# Patient Record
Sex: MALE | Race: WHITE | Employment: FULL TIME | URBAN - METROPOLITAN AREA
[De-identification: names, ages, dates, MRNs, and addresses within clinical notes are randomized per-mention and may not be internally consistent; named-entity substitution may affect disease eponyms.]

---

## 2019-12-18 ENCOUNTER — APPOINTMENT (OUTPATIENT)
Dept: CT IMAGING | Facility: CLINIC | Age: 54
DRG: 175 | End: 2019-12-18
Attending: EMERGENCY MEDICINE
Payer: COMMERCIAL

## 2019-12-18 ENCOUNTER — HOSPITAL ENCOUNTER (INPATIENT)
Facility: CLINIC | Age: 54
LOS: 4 days | Discharge: HOME | DRG: 175 | End: 2019-12-22
Attending: EMERGENCY MEDICINE | Admitting: INTERNAL MEDICINE
Payer: COMMERCIAL

## 2019-12-18 DIAGNOSIS — R91.8 LUNG MASS: ICD-10-CM

## 2019-12-18 DIAGNOSIS — I26.99 ACUTE PULMONARY EMBOLISM, UNSPECIFIED PULMONARY EMBOLISM TYPE, UNSPECIFIED WHETHER ACUTE COR PULMONALE PRESENT (H): ICD-10-CM

## 2019-12-18 LAB
ALBUMIN SERPL-MCNC: 3.9 G/DL (ref 3.4–5)
ALP SERPL-CCNC: 81 U/L (ref 40–150)
ALT SERPL W P-5'-P-CCNC: 22 U/L (ref 0–70)
ANION GAP SERPL CALCULATED.3IONS-SCNC: 3 MMOL/L (ref 3–14)
AST SERPL W P-5'-P-CCNC: 18 U/L (ref 0–45)
BASOPHILS # BLD AUTO: 0 10E9/L (ref 0–0.2)
BASOPHILS NFR BLD AUTO: 0.3 %
BILIRUB SERPL-MCNC: 0.5 MG/DL (ref 0.2–1.3)
BUN SERPL-MCNC: 18 MG/DL (ref 7–30)
CALCIUM SERPL-MCNC: 9.2 MG/DL (ref 8.5–10.1)
CHLORIDE SERPL-SCNC: 106 MMOL/L (ref 94–109)
CO2 SERPL-SCNC: 29 MMOL/L (ref 20–32)
CREAT SERPL-MCNC: 1.09 MG/DL (ref 0.66–1.25)
D DIMER PPP FEU-MCNC: 5.8 UG/ML FEU (ref 0–0.5)
DIFFERENTIAL METHOD BLD: NORMAL
EOSINOPHIL # BLD AUTO: 0.3 10E9/L (ref 0–0.7)
EOSINOPHIL NFR BLD AUTO: 4.1 %
ERYTHROCYTE [DISTWIDTH] IN BLOOD BY AUTOMATED COUNT: 11.9 % (ref 10–15)
ERYTHROCYTE [DISTWIDTH] IN BLOOD BY AUTOMATED COUNT: 12.1 % (ref 10–15)
GFR SERPL CREATININE-BSD FRML MDRD: 76 ML/MIN/{1.73_M2}
GLUCOSE SERPL-MCNC: 88 MG/DL (ref 70–99)
HCT VFR BLD AUTO: 39.9 % (ref 40–53)
HCT VFR BLD AUTO: 43.3 % (ref 40–53)
HGB BLD-MCNC: 13.9 G/DL (ref 13.3–17.7)
HGB BLD-MCNC: 15.2 G/DL (ref 13.3–17.7)
IMM GRANULOCYTES # BLD: 0 10E9/L (ref 0–0.4)
IMM GRANULOCYTES NFR BLD: 0.1 %
INTERPRETATION ECG - MUSE: NORMAL
LIPASE SERPL-CCNC: 88 U/L (ref 73–393)
LMWH PPP CHRO-ACNC: 0.33 IU/ML
LYMPHOCYTES # BLD AUTO: 1 10E9/L (ref 0.8–5.3)
LYMPHOCYTES NFR BLD AUTO: 13.6 %
MCH RBC QN AUTO: 32.2 PG (ref 26.5–33)
MCH RBC QN AUTO: 32.3 PG (ref 26.5–33)
MCHC RBC AUTO-ENTMCNC: 34.8 G/DL (ref 31.5–36.5)
MCHC RBC AUTO-ENTMCNC: 35.1 G/DL (ref 31.5–36.5)
MCV RBC AUTO: 92 FL (ref 78–100)
MCV RBC AUTO: 92 FL (ref 78–100)
MONOCYTES # BLD AUTO: 0.7 10E9/L (ref 0–1.3)
MONOCYTES NFR BLD AUTO: 9.4 %
NEUTROPHILS # BLD AUTO: 5.2 10E9/L (ref 1.6–8.3)
NEUTROPHILS NFR BLD AUTO: 72.5 %
NRBC # BLD AUTO: 0 10*3/UL
NRBC BLD AUTO-RTO: 0 /100
NT-PROBNP SERPL-MCNC: 45 PG/ML (ref 0–900)
PLATELET # BLD AUTO: 152 10E9/L (ref 150–450)
PLATELET # BLD AUTO: 170 10E9/L (ref 150–450)
POTASSIUM SERPL-SCNC: 4.2 MMOL/L (ref 3.4–5.3)
PROT SERPL-MCNC: 8.3 G/DL (ref 6.8–8.8)
RADIOLOGIST FLAGS: ABNORMAL
RBC # BLD AUTO: 4.32 10E12/L (ref 4.4–5.9)
RBC # BLD AUTO: 4.71 10E12/L (ref 4.4–5.9)
SODIUM SERPL-SCNC: 138 MMOL/L (ref 133–144)
TROPONIN I SERPL-MCNC: <0.015 UG/L (ref 0–0.04)
WBC # BLD AUTO: 6.8 10E9/L (ref 4–11)
WBC # BLD AUTO: 7.1 10E9/L (ref 4–11)

## 2019-12-18 PROCEDURE — 99285 EMERGENCY DEPT VISIT HI MDM: CPT | Mod: 25

## 2019-12-18 PROCEDURE — 85379 FIBRIN DEGRADATION QUANT: CPT | Performed by: EMERGENCY MEDICINE

## 2019-12-18 PROCEDURE — 25000128 H RX IP 250 OP 636: Performed by: EMERGENCY MEDICINE

## 2019-12-18 PROCEDURE — 25000132 ZZH RX MED GY IP 250 OP 250 PS 637: Performed by: INTERNAL MEDICINE

## 2019-12-18 PROCEDURE — 83880 ASSAY OF NATRIURETIC PEPTIDE: CPT | Performed by: INTERNAL MEDICINE

## 2019-12-18 PROCEDURE — 85520 HEPARIN ASSAY: CPT | Performed by: INTERNAL MEDICINE

## 2019-12-18 PROCEDURE — 96366 THER/PROPH/DIAG IV INF ADDON: CPT

## 2019-12-18 PROCEDURE — 25000125 ZZHC RX 250: Performed by: EMERGENCY MEDICINE

## 2019-12-18 PROCEDURE — 25000128 H RX IP 250 OP 636: Performed by: INTERNAL MEDICINE

## 2019-12-18 PROCEDURE — 85025 COMPLETE CBC W/AUTO DIFF WBC: CPT | Performed by: EMERGENCY MEDICINE

## 2019-12-18 PROCEDURE — 99223 1ST HOSP IP/OBS HIGH 75: CPT | Mod: AI | Performed by: INTERNAL MEDICINE

## 2019-12-18 PROCEDURE — 80053 COMPREHEN METABOLIC PANEL: CPT | Performed by: EMERGENCY MEDICINE

## 2019-12-18 PROCEDURE — 36415 COLL VENOUS BLD VENIPUNCTURE: CPT | Performed by: INTERNAL MEDICINE

## 2019-12-18 PROCEDURE — 83690 ASSAY OF LIPASE: CPT | Performed by: EMERGENCY MEDICINE

## 2019-12-18 PROCEDURE — 12000000 ZZH R&B MED SURG/OB

## 2019-12-18 PROCEDURE — 96365 THER/PROPH/DIAG IV INF INIT: CPT | Mod: 59

## 2019-12-18 PROCEDURE — 84484 ASSAY OF TROPONIN QUANT: CPT | Performed by: EMERGENCY MEDICINE

## 2019-12-18 PROCEDURE — 71275 CT ANGIOGRAPHY CHEST: CPT

## 2019-12-18 PROCEDURE — 93005 ELECTROCARDIOGRAM TRACING: CPT

## 2019-12-18 PROCEDURE — 85027 COMPLETE CBC AUTOMATED: CPT | Performed by: INTERNAL MEDICINE

## 2019-12-18 RX ORDER — NALOXONE HYDROCHLORIDE 0.4 MG/ML
.1-.4 INJECTION, SOLUTION INTRAMUSCULAR; INTRAVENOUS; SUBCUTANEOUS
Status: DISCONTINUED | OUTPATIENT
Start: 2019-12-18 | End: 2019-12-22 | Stop reason: HOSPADM

## 2019-12-18 RX ORDER — ACETAMINOPHEN 325 MG/1
650 TABLET ORAL EVERY 4 HOURS PRN
Status: DISCONTINUED | OUTPATIENT
Start: 2019-12-18 | End: 2019-12-22 | Stop reason: HOSPADM

## 2019-12-18 RX ORDER — HEPARIN SODIUM 10000 [USP'U]/100ML
18 INJECTION, SOLUTION INTRAVENOUS ONCE
Status: DISCONTINUED | OUTPATIENT
Start: 2019-12-18 | End: 2019-12-18

## 2019-12-18 RX ORDER — FAMOTIDINE 20 MG/1
20 TABLET, FILM COATED ORAL 2 TIMES DAILY
Status: DISCONTINUED | OUTPATIENT
Start: 2019-12-18 | End: 2019-12-22 | Stop reason: HOSPADM

## 2019-12-18 RX ORDER — ONDANSETRON 4 MG/1
4 TABLET, ORALLY DISINTEGRATING ORAL EVERY 6 HOURS PRN
Status: DISCONTINUED | OUTPATIENT
Start: 2019-12-18 | End: 2019-12-22 | Stop reason: HOSPADM

## 2019-12-18 RX ORDER — MULTIPLE VITAMINS W/ MINERALS TAB 9MG-400MCG
1 TAB ORAL DAILY
Status: ON HOLD | COMMUNITY
End: 2019-12-22

## 2019-12-18 RX ORDER — HEPARIN SODIUM 10000 [USP'U]/100ML
18 INJECTION, SOLUTION INTRAVENOUS ONCE
Status: COMPLETED | OUTPATIENT
Start: 2019-12-18 | End: 2019-12-18

## 2019-12-18 RX ORDER — HEPARIN SODIUM 10000 [USP'U]/100ML
1550 INJECTION, SOLUTION INTRAVENOUS ONCE
Status: COMPLETED | OUTPATIENT
Start: 2019-12-18 | End: 2019-12-18

## 2019-12-18 RX ORDER — IOPAMIDOL 755 MG/ML
73 INJECTION, SOLUTION INTRAVASCULAR ONCE
Status: COMPLETED | OUTPATIENT
Start: 2019-12-18 | End: 2019-12-18

## 2019-12-18 RX ORDER — LIDOCAINE 40 MG/G
CREAM TOPICAL
Status: DISCONTINUED | OUTPATIENT
Start: 2019-12-18 | End: 2019-12-22 | Stop reason: HOSPADM

## 2019-12-18 RX ORDER — ONDANSETRON 2 MG/ML
4 INJECTION INTRAMUSCULAR; INTRAVENOUS EVERY 6 HOURS PRN
Status: DISCONTINUED | OUTPATIENT
Start: 2019-12-18 | End: 2019-12-22 | Stop reason: HOSPADM

## 2019-12-18 RX ADMIN — Medication 1 LOZENGE: at 22:01

## 2019-12-18 RX ADMIN — FAMOTIDINE 20 MG: 20 TABLET, FILM COATED ORAL at 20:06

## 2019-12-18 RX ADMIN — IOPAMIDOL 73 ML: 755 INJECTION, SOLUTION INTRAVENOUS at 14:10

## 2019-12-18 RX ADMIN — Medication 6960 UNITS: at 14:47

## 2019-12-18 RX ADMIN — HEPARIN SODIUM 18 UNITS/KG/HR: 10000 INJECTION, SOLUTION INTRAVENOUS at 14:47

## 2019-12-18 RX ADMIN — HEPARIN SODIUM 1550 UNITS/HR: 10000 INJECTION, SOLUTION INTRAVENOUS at 23:30

## 2019-12-18 RX ADMIN — Medication 1 MG: at 22:01

## 2019-12-18 RX ADMIN — ACETAMINOPHEN 650 MG: 325 TABLET, FILM COATED ORAL at 20:08

## 2019-12-18 RX ADMIN — SODIUM CHLORIDE 96 ML: 9 INJECTION, SOLUTION INTRAVENOUS at 14:11

## 2019-12-18 RX ADMIN — Medication 1 LOZENGE: at 20:06

## 2019-12-18 ASSESSMENT — ACTIVITIES OF DAILY LIVING (ADL): ADLS_ACUITY_SCORE: 15

## 2019-12-18 ASSESSMENT — ENCOUNTER SYMPTOMS
WEAKNESS: 1
HEADACHES: 1
COUGH: 0
DIARRHEA: 0
SHORTNESS OF BREATH: 1
SORE THROAT: 1
ABDOMINAL PAIN: 1

## 2019-12-18 ASSESSMENT — MIFFLIN-ST. JEOR
SCORE: 1827.82
SCORE: 1820.63

## 2019-12-18 NOTE — ED TRIAGE NOTES
Pt states that over the past 3-4 days he has had worsening SOB, especially with exertion. Reports chest tightness with exertion. Sent here from  for further workup.

## 2019-12-18 NOTE — ED PROVIDER NOTES
"  History     Chief Complaint:  Shortness of Breath      HPI   Scar Easley is a 54 year old male who presents with shortness of breath. The patient reports 3-4 days of weakness and shortness of breath through exertion. He also complains of chest pain, sore throat last night, on and off headache, and LLQ abdominal pain a week ago that has since resolved. He states he saw his primary today and was advised to present to the ED. He denies leg swelling, cold, cough, diarrhea, and family history of blood clots or heart disease. Of note the patient recently traveled to [place] via flight.    Allergies:  No known drug allergies    Medications:    The patient is not currently taking any prescribed medications.    Past Medical History:    The patient does not have any past pertinent medical history.    Past Surgical History:    The patient does not have any past pertinent medical history.    Family History:    History reviewed. No pertinent family history.     Social History:  Smoking status: never smoker  Alcohol use: yes  Drug use: no  The patient presents to the emergency department by himself.  Marital Status:     The patient works in construction.    Review of Systems   HENT: Positive for sore throat.    Respiratory: Positive for shortness of breath. Negative for cough.    Cardiovascular: Positive for chest pain. Negative for leg swelling.   Gastrointestinal: Positive for abdominal pain. Negative for diarrhea.   Neurological: Positive for weakness and headaches.   All other systems reviewed and are negative.    Physical Exam     Patient Vitals for the past 24 hrs:   BP Temp Temp src Pulse Resp SpO2 Height Weight   12/18/19 1135 135/79 97  F (36.1  C) Temporal 57 18 96 % 1.905 m (6' 3\") 90.7 kg (200 lb)       Physical Exam  Constitutional: white male sitting, no respiratory distress. HENT: No signs of trauma.   Eyes: EOM are normal. Pupils are equal, round, and reactive to light.   Neck: Normal range of " motion. No JVD present. No cervical adenopathy.  Cardiovascular: Regular rhythm.  Exam reveals no gallop and no friction rub.  2+ radial and femoral pulses.  No murmur heard.  Pulmonary/Chest: Bilateral breath sounds normal. No wheezes, rhonchi or rales.  Abdominal: Soft. No tenderness. No rebound or guarding.   Musculoskeletal: No edema. No tenderness.   Lymphadenopathy: No lymphadenopathy.   Neurological: Alert and oriented to person, place, and time. Normal strength. Coordination normal.   Skin: Skin is warm and dry. No rash noted. No erythema.       Emergency Department Course   ECG (11:48:06):  Rate 59 bpm. NJ interval 154. QRS duration 94. QT/QTc 424/419. P-R-T axes 70 78 57. Sinus bradycardia. Otherwise normal ECG. Interpreted at 1240 by Abhilash Phelan MD.      Imaging:  Radiographic findings were communicated with the patient who voiced understanding of the findings.  CT Chest Pulmonary embolism w/ contrast   IMPRESSION:  1. Large bilateral pulmonary emboli with possible right heart strain.  2. Nodular airspace opacity in the medial left lung base could be  related to infection or atelectasis. Malignancy cannot be entirely  excluded. Consider PET/CT.    [Critical Result: Pulmonary embolism] as per radiology.       Laboratory:  CBC: WBC: 7.1, HGB: 15.2, PLT: 170  CMP: Glucose 88, (Creatinine: 1.09)\  D-dimer: 5.8  1248 Troponin: <0.015  Lipase: 88    Interventions:  1447 heparin 6960 units IV  1447 heparin 18 units/kg/hr IV    Emergency Department Course:  Nursing notes and vitals reviewed. (6779) I performed an exam of the patient as documented above.     IV inserted. Medicine administered as documented above. Blood drawn. This was sent to the lab for further testing, results above.     The patient was sent for a chest CT while in the emergency department, findings above.     An EKG was recorded. Results as noted above.     1432 I rechecked the patient and discussed the results of his workup thus  far.     1500  I consulted with Dr. Houser of the hospitalist services. He is in agreement to accept the patient for admission.    Findings and plan explained to the Patient who consents to admission. Discussed the patient with Dr. Houser, who will admit the patient to a medical telemetry bed for further monitoring, evaluation, and treatment.       Impression & Plan    Medical Decision Making:  Scar Easley is a 54 year old male who presents to the ED with shortness of breath. He had been in good health until a few days ago when he began feeling dyspneic on exertion. He has no history of heart or lung disease, no risk factors for PE. Sitting at rest he is comfortable. His sats and vital signs are good and his EKG is unremarkable. Labs were obtained. Initial d-dimer was elevated so CT was obtained which is positive for pulmonary embolus with questionable mass at the left lung base. This was explained to the patient. He was started on IV heparin and he will be admitted for further evaluation and treatment.    Diagnosis:    ICD-10-CM    1. Acute pulmonary embolism, unspecified pulmonary embolism type, unspecified whether acute cor pulmonale present (H) I26.99    2. Lung mass R91.8        Disposition:  Admitted to adult med.    Saul Ramirez  12/18/2019    EMERGENCY DEPARTMENT  Scribe Disclosure:  I, Saul Ramirez, am serving as a scribe at 12:45 PM on 12/18/2019 to document services personally performed by Abhilash Phelan MD based on my observations and the provider's statements to me.        Abhilash Phelan MD  12/18/19 0807

## 2019-12-18 NOTE — ED NOTES
"[facility]  ED Nurse Handoff Report    ED Chief complaint: Shortness of Breath      ED Diagnosis:   Final diagnoses:   None       Code Status: not addressed     Allergies: No Known Allergies    Activity level - Baseline/Home:  Independent  Activity Level - Current:   Independent    Patient's Preferred language: ENglish   Needed?: No    Isolation: No  Infection: Not Applicable  Bariatric?: No    Vital Signs:   Vitals:    12/18/19 1135   BP: 135/79   Pulse: 57   Resp: 18   Temp: 97  F (36.1  C)   TempSrc: Temporal   SpO2: 96%   Weight: 90.7 kg (200 lb)   Height: 1.905 m (6' 3\")       Cardiac Rhythm: ,        Pain level: 0-10 Pain Scale: 2    Is this patient confused?: No   Does this patient have a guardian?  No         If yes, is there guardianship documents in the Epic \"Code/ACP\" activity?  No         Guardian Notified?  No  Gage - Suicide Severity Rating Scale Completed?  Yes  If yes, what color did the patient score?  White    Patient Report: Initial Complaint: 1 week ago ahd abd pain on left, it went away in 2 days but has been SOB with excursion since  Focused Assessment: not SOB here, but not doing any activity, has a discomfort in chest with deep breath   Tests Performed: labs, CT   Abnormal Results: CT   Treatments provided: will start heparin     Family Comments: none here     OBS brochure/video discussed/provided to patient/family: No              Name of person given brochure if not patient: na              Relationship to patient: na    ED Medications:   Medications   iopamidol (ISOVUE-370) solution 73 mL (73 mLs Intravenous Given 12/18/19 1410)   sodium chloride 0.9 % bag 100mL for CT scan flush use (96 mLs Intravenous Given 12/18/19 1411)       Drips infusing?:  Yes    For the majority of the shift this patient was Green.   Interventions performed were none.    Severe Sepsis OR Septic Shock Diagnosis Present: No    To be done/followed up on inpatient unit:  unknown     ED " NURSE PHONE NUMBER: [phone]

## 2019-12-18 NOTE — H&P
[facility]    History and Physical  Hospitalist       Date of Admission:  12/18/2019  Date of Service (when I saw the patient): 12/18/19    Assessment & Plan   Scar Easley is a pleasant 54 year old gentleman who presented today for evaluation of recent-onset shortness of breath and dyspnea with exertion.  Current problems include:    1. Bilateral pulmonary emboli; unclear cause.  - heparin infusion begun in ED; continued after transfer to [facility]; dosing per Pharmacy  - reviewed this and Left lung mass w/ Dr. KAMILLA López of Oncology  - telemetry     2. Possible Left lung mass; significance unknown at present.  - Reviewed w/ Oncology; consult requested  - NPO for possible biopsy tomorrow    3. Possible Right heart strain, noted on EKG, and CT today.  - check BNP  - repeat troponin in a.m.; if increased, consider ECHO    4. Sore throat; likely mild URI  - increase fluids; monitor  - cepachol lozenges PRN    5. Headache; resolving.      DVT Prophylaxis: is on heparin infusion; Pneumatic Compression Devices and Ambulate every shift  Code Status: Full Code    Disposition: Expected discharge in 2-3 days.      RICK Houser MD, FACP     Internal Medicine Hospitalist    Primary Care Physician   Jose Cruz Edwards    Chief Complaint   Acute shortness of breath and dyspnea with exertion over the past 3 days.    History was obtained from the ED provider, the EHR and the patient.    History of Present Illness   Scar Easley is a pleasant 54 year old gentleman who presented to the ED today for evaluation of shortness of breath.  He has had 3-4 days of increased shortness of breath with activities, including climbing stairs. No F/C or cough.  No blood in his sputum.  Had upper chest discomfort earlier; now resolved.  Has had a sore throat x 1-2 days; no ill contacts at home.  Has not had his Flu vaccination this year.  About 7-10 days ago, Scar had mid and Left upper abdominal pain that has since resolved.  He  saw his primary today and was advised to present to the ED.  He recalls having Left lower leg pain while walking - about a week ago - but no leg swelling.  Of note - the patient flew to [place] and back around [facility].      Past Medical History    I have reviewed this patient's medical history and updated it with pertinent information if needed.   History reviewed. No pertinent past medical history.    Past Surgical History   I have reviewed this patient's surgical history and updated it with pertinent information if needed.  History reviewed. No pertinent surgical history.    Prior to Admission Medications   Prior to Admission Medications   Prescriptions Last Dose Informant Patient Reported? Taking?   multivitamin w/minerals (THERA-VIT-M) tablet Past Week at Unknown time Self Yes Yes   Sig: Take 1 tablet by mouth daily      Facility-Administered Medications: None     Allergies   No Known Allergies    Social History   I have reviewed this patient's social history and updated it with pertinent information if needed. Scar Easley  reports that he has never smoked. He has never used smokeless tobacco. He reports current alcohol use. He reports that he does not use drugs. Is  and works as a  in construction. Tobacco: none; alcohol: 1-2 drinks per week.    Family History   I have reviewed this patient's family history and updated it with pertinent information if needed.   History reviewed. No pertinent family history.   Mother - d. Age 74 from (?) uterine CA; Father is 84 and has mild cognitive impairment. Has 1 brother: healthy; 1 brother  in a MVA.    Review of Systems   The 10 point Review of Systems is negative other than noted in the HPI or here.     Physical Exam   Temp: 98.5  F (36.9  C) Temp src: Oral BP: 119/69 Pulse: 67   Resp: 18 SpO2: 94 % O2 Device: None (Room air)    Vital Signs with Ranges  Temp:  [97  F (36.1  C)-98.5  F (36.9  C)] 98.5  F (36.9  C)  Pulse:  [57-67]  67  Resp:  [18] 18  BP: (119-135)/(69-79) 119/69  SpO2:  [94 %-96 %] 94 %  200 lbs 0 oz    Constitutional: awake, lying in bed; in no apparent distress; eating dinner  Eyes: sclerae clear; PERRLA/EOMI  HEENT: AT/NC; moist mucous membranes, normal dentition.  Neck: supple, good ROM; no LA, no bruits, no JVD, no thyromegaly    Respiratory: good air entry bilaterally; no wheezing or rhonchi.  Back: no focal tenderness or other abnormalities  Cardiovascular: Regular rate and rhythm; S1, S2 noted; no murmur, rub or gallop  GI: abdomen flat, + bowel sounds, soft, non-tender, non-distended; no masses or organomegaly  Skin: no rash, no cyanosis  Musculoskeletal: no edema; no obvious joint abnormalities; no calf tenderness  Neurologic: alert; oriented to person, date, place; follows directions well; no focal motor or sensory deficits  Psychiatric: appears euthymic    Data   Data reviewed today:  I reviewed all relevant lab results and imaging reports from today.    Recent Labs   Lab 12/18/19  1248   WBC 7.1   HGB 15.2   MCV 92         POTASSIUM 4.2   CHLORIDE 106   CO2 29   BUN 18   CR 1.09   ANIONGAP 3   PATRICIA 9.2   GLC 88   ALBUMIN 3.9   PROTTOTAL 8.3   BILITOTAL 0.5   ALKPHOS 81   ALT 22   AST 18   LIPASE 88   TROPI <0.015       Recent Results (from the past 24 hour(s))   CT Chest Pulmonary Embolism w Contrast   Result Value    Radiologist flags Pulmonary embolism (AA)    Narrative    CT CHEST PULMONARY EMBOLISM WITH CONTRAST December 18, 2019 2:19 PM     HISTORY: Shortness of breath.    TECHNIQUE: 73mL Isovue-370. Radiation dose for this scan was reduced  using automated exposure control, adjustment of the mA and/or kV  according to patient size, or iterative reconstruction technique.    COMPARISON: None.    FINDINGS: There are large bilateral pulmonary emboli affecting lobar  and segmental pulmonary arteries. There is prominence of the right  ventricle, raising the possibility of right heart strain.    The  aorta is unremarkable. No mediastinal, hilar, or axillary  adenopathy. No pneumothorax. No pleural or pericardial effusion. There  is a 6 mm nodule in the right minor fissure (series 8, image 182).  Another small pleural-based nodule in the anterior right middle lobe  measures 4 mm (series 8, image 196). There is nodular airspace opacity  in the medial left lung base that measures up to 3.1 x 1.5 cm (series  8, image 294).    No worrisome abnormality in the visualized portions of the upper  abdomen. Visualized bones are unremarkable.      Impression    IMPRESSION:  1. Large bilateral pulmonary emboli with possible right heart strain.  2. Nodular airspace opacity in the medial left lung base could be  related to infection or atelectasis. Malignancy cannot be entirely  excluded. Consider PET/CT.    [Critical Result: Pulmonary embolism]    Finding was identified on 12/18/2019 2:20 PM.     Dr. Phelan was contacted by me on 12/18/2019 2:25 PM and verbalized  understanding of the critical result.     STACY WOODARD MD

## 2019-12-18 NOTE — PROGRESS NOTES
RECEIVING UNIT ED HANDOFF REVIEW    ED Nurse Handoff Report was reviewed by: Maira Self RN on December 18, 2019 at 4:08 PM

## 2019-12-18 NOTE — PHARMACY-ADMISSION MEDICATION HISTORY
Pharmacy Medication History  Admission medication history interview status for the 12/18/2019  admission is complete. See EPIC admission navigator for prior to admission medications     Medication history sources: Patient  Medication history source reliability: Good  Adherence assessment: Good    Significant changes made to the medication list:  -Received one time dose of aspirin 81 mg, 4 tablets earlier today.      Additional medication history information:       Medication reconciliation completed by provider prior to medication history? No    Time spent in this activity: 5 min      Prior to Admission medications    Medication Sig Last Dose Taking? Auth Provider   multivitamin w/minerals (THERA-VIT-M) tablet Take 1 tablet by mouth daily Past Week at Unknown time Yes Unknown, Entered By History

## 2019-12-19 ENCOUNTER — APPOINTMENT (OUTPATIENT)
Dept: ULTRASOUND IMAGING | Facility: CLINIC | Age: 54
DRG: 175 | End: 2019-12-19
Attending: INTERNAL MEDICINE
Payer: COMMERCIAL

## 2019-12-19 LAB
ANION GAP SERPL CALCULATED.3IONS-SCNC: 5 MMOL/L (ref 3–14)
BUN SERPL-MCNC: 13 MG/DL (ref 7–30)
CALCIUM SERPL-MCNC: 9.3 MG/DL (ref 8.5–10.1)
CHLORIDE SERPL-SCNC: 107 MMOL/L (ref 94–109)
CO2 SERPL-SCNC: 28 MMOL/L (ref 20–32)
CREAT SERPL-MCNC: 1.12 MG/DL (ref 0.66–1.25)
GFR SERPL CREATININE-BSD FRML MDRD: 73 ML/MIN/{1.73_M2}
GLUCOSE SERPL-MCNC: 83 MG/DL (ref 70–99)
LMWH PPP CHRO-ACNC: 0.27 IU/ML
LMWH PPP CHRO-ACNC: 0.5 IU/ML
POTASSIUM SERPL-SCNC: 4.2 MMOL/L (ref 3.4–5.3)
SODIUM SERPL-SCNC: 140 MMOL/L (ref 133–144)

## 2019-12-19 PROCEDURE — 25000132 ZZH RX MED GY IP 250 OP 250 PS 637: Performed by: INTERNAL MEDICINE

## 2019-12-19 PROCEDURE — 80048 BASIC METABOLIC PNL TOTAL CA: CPT | Performed by: INTERNAL MEDICINE

## 2019-12-19 PROCEDURE — 36415 COLL VENOUS BLD VENIPUNCTURE: CPT | Performed by: INTERNAL MEDICINE

## 2019-12-19 PROCEDURE — 12000000 ZZH R&B MED SURG/OB

## 2019-12-19 PROCEDURE — 99231 SBSQ HOSP IP/OBS SF/LOW 25: CPT | Performed by: INTERNAL MEDICINE

## 2019-12-19 PROCEDURE — 85520 HEPARIN ASSAY: CPT | Performed by: INTERNAL MEDICINE

## 2019-12-19 PROCEDURE — 99207 ZZC MOONLIGHTING INDICATOR: CPT | Performed by: INTERNAL MEDICINE

## 2019-12-19 PROCEDURE — 25000128 H RX IP 250 OP 636: Performed by: INTERNAL MEDICINE

## 2019-12-19 PROCEDURE — 93970 EXTREMITY STUDY: CPT

## 2019-12-19 RX ORDER — HEPARIN SODIUM 10000 [USP'U]/100ML
1750 INJECTION, SOLUTION INTRAVENOUS CONTINUOUS
Status: DISCONTINUED | OUTPATIENT
Start: 2019-12-19 | End: 2019-12-22

## 2019-12-19 RX ADMIN — Medication 1 LOZENGE: at 00:00

## 2019-12-19 RX ADMIN — FAMOTIDINE 20 MG: 20 TABLET, FILM COATED ORAL at 21:28

## 2019-12-19 RX ADMIN — HEPARIN SODIUM 1550 UNITS/HR: 10000 INJECTION, SOLUTION INTRAVENOUS at 01:59

## 2019-12-19 RX ADMIN — ACETAMINOPHEN 650 MG: 325 TABLET, FILM COATED ORAL at 11:31

## 2019-12-19 RX ADMIN — Medication 1 LOZENGE: at 11:31

## 2019-12-19 RX ADMIN — FAMOTIDINE 20 MG: 20 TABLET, FILM COATED ORAL at 11:31

## 2019-12-19 RX ADMIN — Medication 2600 UNITS: at 09:23

## 2019-12-19 RX ADMIN — HEPARIN SODIUM 1750 UNITS/HR: 10000 INJECTION, SOLUTION INTRAVENOUS at 20:26

## 2019-12-19 RX ADMIN — ACETAMINOPHEN 650 MG: 325 TABLET, FILM COATED ORAL at 00:10

## 2019-12-19 RX ADMIN — ACETAMINOPHEN 650 MG: 325 TABLET, FILM COATED ORAL at 20:32

## 2019-12-19 ASSESSMENT — ACTIVITIES OF DAILY LIVING (ADL)
ADLS_ACUITY_SCORE: 11

## 2019-12-19 NOTE — PROGRESS NOTES
"[facility]  Hospitalist Progress Note for 2019:          Assessment and Plan:    Scar Easley is a pleasant 54 year old gentleman who presented today for evaluation of recent-onset shortness of breath and dyspnea with exertion.  Current problems include:      Bilateral pulmonary emboli: unclear cause.  - heparin infusion begun in ED  -Continue heparin drip pending oncology consultation and recommendations for possible biopsy   - reviewed this and Left lung mass w/ Dr. KAMILLA López of Oncology  -Continue telemetry   - echocardiogram      Possible Left lung mass:   significance unknown at present.  - Reviewed w/ Oncology; consult requested  - NPO for possible biopsy tomorrow      Possible Right heart strain:   noted on EKG, and CT today.  - nl BNP  - ECHO      Sore throat:   likely mild URI  - increase fluids; monitor  - cepachol lozenges PRN      Headache:resolving.        DVT Prophylaxis: is on heparin infusion; Pneumatic Compression Devices and Ambulate every shift  Code Status: Full Code     Disposition: Expected discharge in 2-3 days.    Neema Flores MD.  Hospitalist [phone] (7am -6 pm)                 Interval History:   C/o sore throat. Denies SOB              Medications:       famotidine  20 mg Oral BID     influenza vaccine adult (product based on age)  0.5 mL Intramuscular Prior to discharge     sodium chloride (PF)  3 mL Intracatheter Q8H     acetaminophen, sore throat lozenge, lidocaine 4%, lidocaine (buffered or not buffered), melatonin, naloxone, ondansetron **OR** ondansetron, - MEDICATION INSTRUCTIONS -, sodium chloride (PF)               Physical Exam:   Blood pressure 94/67, pulse 86, temperature 98.2  F (36.8  C), temperature source Oral, resp. rate 20, height 1.905 m (6' 3\"), weight 90 kg (198 lb 6.6 oz), SpO2 93 %.  Wt Readings from Last 4 Encounters:   19 90 kg (198 lb 6.6 oz)         Vital Sign Ranges  Temperature Temp  Av  F (36.7  C)  Min: 97  F " (36.1  C)  Max: 98.5  F (36.9  C)   Blood pressure Systolic (24hrs), Av , Min:94 , Max:135        Diastolic (24hrs), Av, Min:59, Max:79      Pulse Pulse  Av.8  Min: 57  Max: 86   Respirations Resp  Av.5  Min: 18  Max: 20   Pulse oximetry SpO2  Av %  Min: 93 %  Max: 97 %         Intake/Output Summary (Last 24 hours) at 2019 1058  Last data filed at 2019 1800  Gross per 24 hour   Intake 340 ml   Output --   Net 340 ml       Constitutional: Awake, alert, cooperative, no apparent distress   Lungs: Clear to auscultation bilaterally, no crackles or wheezing   Cardiovascular: Regular rate and rhythm, normal S1 and S2, and no murmur noted   Abdomen: Normal bowel sounds, soft, non-distended, non-tender   Skin: No rashes, no cyanosis, no edema   Neuro:                Data:   All laboratory data reviewed

## 2019-12-19 NOTE — PLAN OF CARE
DATE & TIME: 12/18/2019 [id number]   Cognitive Concerns/ Orientation : A&O x4   BEHAVIOR & AGGRESSION TOOL COLOR: Green  CIWA SCORE: NA   ABNL VS/O2: VSS on RA  MOBILITY: independent  PAIN MANAGMENT: tylenol given 1x for headache  DIET: regular- Npo at midnight for possible procedure  BOWEL/BLADDER: continent of B/B.   ABNL LAB/BG: D-Dimer 5.8  DRAIN/DEVICES: PIV Heparin infusing @15.7mL/hr  TELEMETRY RHYTHM: NSR  SKIN: intact  TESTS/PROCEDURES: NA  D/C DAY/GOALS/PLACE: pending   OTHER IMPORTANT INFO: Hem/onc consult.

## 2019-12-19 NOTE — PROGRESS NOTES
Admission    Patient arrives to room 618 via cart from ED.  Care plan note: see progress note above.     Inpatient nursing criteria listed below were met:    PCD's Documented: NA  Skin issues/needs documented :NA  Isolation education started/completed NA  Patient allergies verified with patient: Yes  Verified completion of [place] Risk Assessment Tool:  Yes  Verified completion of Guardianship screening tool: No  Fall Prevention: Care plan updated, Education given and documented Yes  Care Plan initiated: Yes  Home medications documented in belongings flowsheet: NA  Patient belongings documented in belongings flowsheet: Yes  Reminder note (belongings/ medications) placed in discharge instructions: Yes  Admission profile/ required documentation complete: Yes  Bedside Report Letter given and explained to patient Yes

## 2019-12-19 NOTE — CONSULTS
Consult Date:  12/19/2019      REQUESTING PHYSICIAN:  Neema Flores MD.      REASON FOR CONSULTATION:  Bilateral pulmonary emboli, DVT of the leg, and left lung nodule.      HISTORY OF PRESENT ILLNESS:  Medical records reviewed, history obtained and the patient examined.      Mr. Easley is a 54-year-old pleasant man with no significant past medical history.  He presented to the emergency room with progressive shortness of breath for 3-4 days in addition to weakness mostly with exertion.  He had  left lower quadrant abdominal pain about a week prior that lasted for a few days and dissipated without change in bowel habits.  He had sore throat for 1 day without cough or expectoration.  His health has been normal without weight loss or anorexia.  He gets occasionally tired more than normal, but it has been intermittent and not progressive.      He traveled to [place] around [facility] by airplane and a week later he went to [place] also by airplane and returned after 4 days and he was very active there without shortness of breath.  He did not feel any swelling, pain or redness in his legs.  He was found to have bilateral pulmonary emboli and currently is on IV heparin.  He does not feel any significant improvement in his breathing, but he has not done any activity in the hospital.  He had no posterior chest pain and no dizziness currently, but had occasional orthostatic dizziness previously.      PAST MEDICAL HISTORY AND SURGICAL HISTORY:  None.      MEDICATIONS PRIOR TO ADMISSION:  None.        CURRENT MEDICATIONS:  Pepcid and IV heparin.      ALLERGIES:  NONE.      SOCIAL HISTORY:  He is , worked in construction, nonsmoker, no heavy alcohol.      FAMILY HISTORY:  Negative for thromboembolic events.  His mother had uterine cancer at an older age, his brother had melanoma at age 50, and his maternal grandparents had cancer and were smokers, specifics unknown and the cancers were at older age.      REVIEW OF  SYSTEMS:  Significant for sore throat, shortness of breath, abdominal pain, weakness, exertional dyspnea.  No weight loss or anorexia.  No change in bowel or urine habits.  No unusual bone pain.  The remainder of 10-point system review is unremarkable.      PHYSICAL EXAMINATION:   VITAL SIGNS:  Stable as charted, afebrile.  Oxygen is 97% on room air.  Weight is 198.   HEENT:  Normocephalic, atraumatic, sclerae anicteric.   NECK:  Supple, no JVD, no lymphadenopathy.   LUNGS:  Clear to auscultation, no dullness.   HEART:  Regular rhythm with gallop.   ABDOMEN:  Soft, nontender, no organomegaly.   EXTREMITIES:  No clubbing, cyanosis or edema.  The left leg circumference is over a centimeter bigger than the right, but no tenderness, redness or Homans sign.   LYMPHATIC:  No lymphadenopathy in cervical, supraclavicular, axillary, epitrochlear or inguinal areas.   NEUROLOGIC:  Grossly intact.   SKIN:  Limited skin examination, no petechia or ecchymosis, no rash.      ANCILLARY DATA:  Comprehensive chemistry panel normal.  Troponin less than 0.015.  D-dimer 5.8.  CBC normal, MCV 98.  White count 7.1, hemoglobin 15.2, platelets 170.  Ultrasound of the legs showed short segment DVT in the left popliteal vein, appears to be active. Right leg is normal.  CT scan of chest, and I reviewed the images independently, showing large bilateral pulmonary emboli with possible right heart strain.  There was a nodular air space opacity in the medial left lung base, could be infectious or atelectasis, malignancy cannot be entirely excluded.  The size was 3.1 x 1.5 cm.      IMPRESSION:   1.  Bilateral pulmonary emboli with sizable clots and left leg popliteal deep venous thrombosis:  Provoking factor could be recent travel.   2.  Right heart strain.   3.  Left lower lobe nodular density, indeterminate, nonsmoker.      DISCUSSION AND RECOMMENDATIONS:   1.  Agree with IV heparin for acute treatment of DVT and PE.  I do not suspect hereditary  thrombophilia.  Therefore, those tests will not be pursued.  Acquired thrombophilia will be ruled out by antiphospholipid antibody tests.  I would recommend not switching to oral anticoagulation for the next few days until he has significant clinical improvement.  If he has no evidence of antiphospholipid antibody, he can be considered for DOAC or Coumadin; however, DOAC is not recommended for antiphospholipid antibody syndrome.  In view of the significant clot burden, bilateral and sizable clots, I did not recommend holding anticoagulation at this point for biopsy and favor repeating imaging, i.e., CT scan in a few weeks to consider biopsy if there is persistent suspicious finding provided he has major improvement in symptom and clot burden.  IVC filter placement is optional with significant PE burden and simultaneous DVT, although if he is monitored in the hospital and showing improvement clinically, may skip the procedure.      I do appreciate the opportunity to participate in the care of Jayy Easley.  Our team will follow while hospitalized.         CRYSTAL ROUSE MD             D: 2019   T: 2019   MT:       Name:     JAYY EASLEY   MRN:      [id number]        Account:       [id number]   :      1965           Consult Date:  2019      Document: [id number]

## 2019-12-19 NOTE — PROGRESS NOTES
Consult dictated:  B/L PE with some evidence of right heart strain, provoking factor travel  Leg DVT  LLL 3 cm nodular density, indeterminate, non smoker    Rec: with the extent of PE, I advise against biopsy at this time, continue parentral IV heparin until significant symptom improvement before switching to oral, will check APLA to determine if he can be treated with DOAC.  Consider CT in 3 weeks to reevaluate the density, if still suspicious it eill be safer to interrupt anticoagulation at that time to do biopsy.

## 2019-12-19 NOTE — PLAN OF CARE
DATE & TIME: 12/19/19, 7028-6893    Cognitive Concerns/ Orientation : Alert and Oriented x4.   BEHAVIOR & AGGRESSION TOOL COLOR: Green  CIWA SCORE: N/A   ABNL VS/O2: VSS, room air   MOBILITY: independent   PAIN MANAGMENT: Back pain 7/10. Tylenol given and ice packs.   DIET: NPO, waiting for oncology to see regarding plan of care (possible biopsy)  BOWEL/BLADDER: Bowel sounds active. Continent.   ABNL LAB/BG: N/A  DRAIN/DEVICES: N/A  TELEMETRY RHYTHM: NSR   SKIN: intact   TESTS/PROCEDURES: had ultrasound of lower extremeties in the a.m., ECHO to be done    D/C DAY/GOALS/PLACE: pending   OTHER IMPORTANT INFO: Heparin drip infusing at 1750. Re-check Hep 10A to be draw at 1530.

## 2019-12-19 NOTE — PLAN OF CARE
DATE & TIME: 12/18-9/2019 2889-2193  Cognitive Concerns/ Orientation : A&O x4   BEHAVIOR & AGGRESSION TOOL COLOR: Green  CIWA SCORE: NA    ABNL VS/O2: VSS on RA  MOBILITY: independent  PAIN MANAGMENT: tylenol given 1x for headache  DIET: regular- Npo at midnight for possible procedure  BOWEL/BLADDER: continent of B/B.   ABNL LAB/BG: D-Dimer 5.8  DRAIN/DEVICES: PIV Heparin infusing @15.5mL/hr  TELEMETRY RHYTHM: NSR  SKIN: intact  TESTS/PROCEDURES: NA  D/C DAY/GOALS/PLACE: pending   OTHER IMPORTANT INFO: Hem/onc consult. IV access lost new PIV placed in Lwrist/hand

## 2019-12-20 ENCOUNTER — APPOINTMENT (OUTPATIENT)
Dept: CARDIOLOGY | Facility: CLINIC | Age: 54
DRG: 175 | End: 2019-12-20
Attending: INTERNAL MEDICINE
Payer: COMMERCIAL

## 2019-12-20 LAB
CARDIOLIPIN ANTIBODY IGG: <1.6 GPL-U/ML (ref 0–19.9)
CARDIOLIPIN ANTIBODY IGM: 0.2 MPL-U/ML (ref 0–19.9)
LMWH PPP CHRO-ACNC: 0.37 IU/ML

## 2019-12-20 PROCEDURE — 25000128 H RX IP 250 OP 636: Performed by: INTERNAL MEDICINE

## 2019-12-20 PROCEDURE — 36415 COLL VENOUS BLD VENIPUNCTURE: CPT | Performed by: INTERNAL MEDICINE

## 2019-12-20 PROCEDURE — 99232 SBSQ HOSP IP/OBS MODERATE 35: CPT | Performed by: INTERNAL MEDICINE

## 2019-12-20 PROCEDURE — 12000000 ZZH R&B MED SURG/OB

## 2019-12-20 PROCEDURE — 85525 HEPARIN NEUTRALIZATION: CPT | Performed by: INTERNAL MEDICINE

## 2019-12-20 PROCEDURE — 86146 BETA-2 GLYCOPROTEIN ANTIBODY: CPT | Performed by: INTERNAL MEDICINE

## 2019-12-20 PROCEDURE — 86147 CARDIOLIPIN ANTIBODY EA IG: CPT | Performed by: INTERNAL MEDICINE

## 2019-12-20 PROCEDURE — 85730 THROMBOPLASTIN TIME PARTIAL: CPT | Performed by: INTERNAL MEDICINE

## 2019-12-20 PROCEDURE — 85520 HEPARIN ASSAY: CPT | Performed by: INTERNAL MEDICINE

## 2019-12-20 PROCEDURE — 00000401 ZZHCL STATISTIC THROMBIN TIME NC: Performed by: INTERNAL MEDICINE

## 2019-12-20 PROCEDURE — 25000132 ZZH RX MED GY IP 250 OP 250 PS 637: Performed by: INTERNAL MEDICINE

## 2019-12-20 PROCEDURE — 85613 RUSSELL VIPER VENOM DILUTED: CPT | Performed by: INTERNAL MEDICINE

## 2019-12-20 PROCEDURE — 00000167 ZZHCL STATISTIC INR NC: Performed by: INTERNAL MEDICINE

## 2019-12-20 PROCEDURE — 93306 TTE W/DOPPLER COMPLETE: CPT | Mod: 26 | Performed by: INTERNAL MEDICINE

## 2019-12-20 PROCEDURE — 93306 TTE W/DOPPLER COMPLETE: CPT

## 2019-12-20 RX ADMIN — FAMOTIDINE 20 MG: 20 TABLET, FILM COATED ORAL at 07:50

## 2019-12-20 RX ADMIN — ACETAMINOPHEN 650 MG: 325 TABLET, FILM COATED ORAL at 11:48

## 2019-12-20 RX ADMIN — Medication 1 MG: at 21:48

## 2019-12-20 RX ADMIN — HEPARIN SODIUM 1750 UNITS/HR: 10000 INJECTION, SOLUTION INTRAVENOUS at 10:50

## 2019-12-20 RX ADMIN — FAMOTIDINE 20 MG: 20 TABLET, FILM COATED ORAL at 21:00

## 2019-12-20 RX ADMIN — ACETAMINOPHEN 650 MG: 325 TABLET, FILM COATED ORAL at 21:00

## 2019-12-20 ASSESSMENT — ACTIVITIES OF DAILY LIVING (ADL)
ADLS_ACUITY_SCORE: 11

## 2019-12-20 NOTE — PROGRESS NOTES
[facility]    Internal Medicine Hospitalist Progress Note  12/20/2019  I evaluated patient on the above date.    Christoph White Jr., MD  [phone] (p)  Text Page        Assessment & Plan New actions/orders today (12/20/2019) are underlined.    Scar Easley is a pleasant 54 year old gentleman who has been relatively healthy, who presented 12/18/2019  for evaluation of recent-onset shortness of breath and dyspnea with exertion and found with bilateral pulmonary emboli and left lung nodular opacity.     Bilateral pulmonary emboli.  Left lower extremity DVT.  Possible right heart strain related to above.  CT chest 12/18 showed large bilateral pulmonary emboli with possible right heart strain. Started on heparin gtt on admit. No definitive provoking factors, though had short plane ride to [place] and back during Thanksgiving time. Hematology-Oncology consulted.  Hereditary thrombophilia not suspected. Antiphospholipid antibody panel ordered 12/19. Bilateral lower extremity US 12/19 showed DVT in L popliteal vein.  - Continue heparin gtt.  - Plan start NOAC 12/21 if he has no evidence of antiphospholipid antibody syndrome.  - Echo 12/20 pending.  - Appreciate help from ANTONY,     Possible left lung mass.  CT 12/18 also showed nodular airspace opacity in the medial left lung base could be related to infection or atelectasis, malignancy cannot be entirely excluded. Afebrile, WBC normal on admit. Hematology-Oncology consulted. Felt too high risk to hold anticoagulation for biopsy now.  - Plan repeat CT outpatient in a few weeks and reconsider need for biopsy at this time.  - Appreciate help from ANTONY.    Sore throat, possible URI.  - Continue PRN cepachol lozenges.     Headache.  - Continue PRN acetaminophen,      GERD.  Started famotidine this hospitalization.  - Continue famotidine.    Diet: Regular Diet Adult    Prophylaxis: PCD's, ambulation. On anticoagulation.  Ruffin Catheter: not present  Code  "Status: Full Code      Disposition Plan   Expected discharge: 1-2d, recommended to prior living arrangement once once on oral anticoagulation and if breathing continues to improve.  Entered: Christoph White MD 12/20/2019, 2:16 PM         Interval History   Doing better today, though still gets winded with activity, still with chest pain at times.    -Data reviewed today: I reviewed all new labs and imaging over the last 24 hours. I personally reviewed no images or EKG's today.    Physical Exam    , Blood pressure 118/69, pulse 67, temperature 97.7  F (36.5  C), temperature source Oral, resp. rate 16, height 1.905 m (6' 3\"), weight 90 kg (198 lb 6.6 oz), SpO2 98 %.  Vitals:    12/18/19 1135 12/18/19 1700   Weight: 90.7 kg (200 lb) 90 kg (198 lb 6.6 oz)     Vital Signs with Ranges  Temp:  [97.7  F (36.5  C)-98.1  F (36.7  C)] 97.7  F (36.5  C)  Pulse:  [57-67] 67  Resp:  [16-18] 16  BP: (115-118)/(57-78) 118/69  SpO2:  [95 %-98 %] 98 %  Patient Vitals for the past 24 hrs:   BP Temp Temp src Pulse Resp SpO2   12/20/19 0755 118/69 97.7  F (36.5  C) Oral 67 16 98 %   12/20/19 0116 115/57 98.1  F (36.7  C) Oral 67 16 95 %   12/19/19 2032 -- -- -- -- 16 --   12/19/19 1531 116/78 97.9  F (36.6  C) Oral 57 18 97 %     I/O's Last 24 hours  No intake/output data recorded.    Constitutional: Awake, alert.  Respiratory: Diminished in bases. No crackles or wheezes.  Cardiovascular: RRR, no m/r/g.  GI:   Skin/Integumen:   Other:        Data   Recent Labs   Lab 12/19/19  0834 12/18/19  1736 12/18/19  1248   WBC  --  6.8 7.1   HGB  --  13.9 15.2   MCV  --  92 92   PLT  --  152 170     --  138   POTASSIUM 4.2  --  4.2   CHLORIDE 107  --  106   CO2 28  --  29   BUN 13  --  18   CR 1.12  --  1.09   ANIONGAP 5  --  3   PATRICIA 9.3  --  9.2   GLC 83  --  88   ALBUMIN  --   --  3.9   PROTTOTAL  --   --  8.3   BILITOTAL  --   --  0.5   ALKPHOS  --   --  81   ALT  --   --  22   AST  --   --  18   LIPASE  --   --  88   TROPI  --   --  " <0.015     Recent Labs   Lab Test 12/19/19  0834 12/18/19  1248   GLC 83 88         No results found for this or any previous visit (from the past 24 hour(s)).    Medications   All medications were reviewed.    heparin 100 unit/mL in 0.45% NaCl 1,750 Units/hr (12/20/19 1050)     - MEDICATION INSTRUCTIONS -         famotidine  20 mg Oral BID     influenza vaccine adult (product based on age)  0.5 mL Intramuscular Prior to discharge     sodium chloride (PF)  3 mL Intracatheter Q8H

## 2019-12-20 NOTE — PLAN OF CARE
DATE & TIME:12/20/19, 4825-2954    Cognitive Concerns/ Orientation : Alert and Oriented x4.   BEHAVIOR & AGGRESSION TOOL COLOR: Green   CIWA SCORE: N/A   ABNL VS/O2: VSS on room air   MOBILITY: Independent   PAIN MANAGMENT: Tylenol and cold packs for lower back pain. Patient stated back pain 3/10 this morning and 2/10 this afternoon.   DIET: Regular   BOWEL/BLADDER: Bowel sounds active in four quadrants. Continent of urine.   ABNL LAB/BG: Heparin 0.37 at 0749. Re-check in a.m.   DRAIN/DEVICES: N/A  TELEMETRY RHYTHM: NSR  SKIN: intact   TESTS/PROCEDURES: had an ECHO procedure done earlier today.   D/C DAY/GOALS/PLACE: pending   OTHER IMPORTANT INFO: Heparin drip infusing at 1750. Re-check in the A.M.

## 2019-12-20 NOTE — PLAN OF CARE
DATE & TIME: 12/19/19 3214-7879      Cognitive Concerns/ Orientation : Alert and Oriented x4, calm and cooperative  BEHAVIOR & AGGRESSION TOOL COLOR: Green  CIWA SCORE: N/A      ABNL VS/O2: VSS on RA  MOBILITY: independent   PAIN MANAGMENT: C/o headache/back pain, given tylenol x 1 with relief.   DIET: Regular   BOWEL/BLADDER: Continent of B/B   ABNL LAB/BG: N/A  DRAIN/DEVICES: N/A  TELEMETRY RHYTHM: NSR   SKIN: intact   TESTS/PROCEDURES: had ultrasound of lower extremeties in the a.m., ECHO to be done,     D/C DAY/GOALS/PLACE: pending improvement, per oncology, not ready to switch to oral coagulation.   OTHER IMPORTANT INFO: Heparin drip infusing at 1750. Re-check Hep 10A 12/20 @ 0600.

## 2019-12-20 NOTE — PLAN OF CARE
DATE & TIME: 12/19/19, 4460 - 2643    Cognitive Concerns/ Orientation : A&O x 4   BEHAVIOR & AGGRESSION TOOL COLOR: Green   ABNL VS/O2: VSS on room air  MOBILITY: Independent  PAIN MANAGMENT: Denied  DIET: Regular  BOWEL/BLADDER: Continent  ABNL LAB/BG: Heparin 10a pending  DRAIN/DEVICES: PIV infusing Heparin at 17.5 ml/hr  TELEMETRY RHYTHM: SD  SKIN: Intact  TESTS/PROCEDURES: ECHO pending  D/C DAY/GOALS/PLACE: Pending clinical progress  OTHER IMPORTANT INFO: For re-check of heparin 10a levels at 0600

## 2019-12-20 NOTE — PROGRESS NOTES
Progress Note     Primary Oncologist/Hematologist:  Dr. Merlos          Assessment and Plan:     1.  Bilateral pulmonary emboli with sizable clots and left leg popliteal deep venous thrombosis  - Provoking factor could be recent travel.   - Right heart strain  - Started IV heparin  - Hereditary thrombophilia not suspected and that work up will not be pursued  - Acquired thrombophilia will be ruled out by antiphospholipid antibody tests, pending.   - Oral anticoagulants when he has significant clinical improvement  - If he has no evidence of antiphospholipid antibody, he can be considered for DOAC or Coumadin; however, DOAC is not recommended for antiphospholipid antibody syndrome.  - Breathing fair    2.  Left lower lobe nodular density, indeterminate, nonsmoker.   - In view of the significant clot burden, bilateral and sizable clots, it is not recommended to hold anticoagulation at this point for biopsy   - Favor repeating imaging, i.e., CT scan in a few weeks to consider biopsy if there is persistent suspicious finding provided he has major improvement in symptom and clot burden.   - Would be okay to follow up with Dr. Merlos is 3-4 weeks at [facility]. I have put in orders and will have the clinic call him to schedule. We will decide on the timing of the repeat CT, either before or after that visit.     Octavio LARIOS, CNP  [facility]  [phone] (office), [phone] (cell)        Interval History:     Reviewed oral anticoagulants              Review of Systems:     The 5 point Review of Systems is negative other than noted in the HPI             Medications:   Scheduled Medications    famotidine  20 mg Oral BID     influenza vaccine adult (product based on age)  0.5 mL Intramuscular Prior to discharge     sodium chloride (PF)  3 mL Intracatheter Q8H     PRN Medications  acetaminophen, sore throat lozenge, sore throat lozenge, lidocaine 4%, lidocaine (buffered or not buffered),  "melatonin, naloxone, ondansetron **OR** ondansetron, - MEDICATION INSTRUCTIONS -, sodium chloride (PF)               Physical Exam:   Vitals were reviewed  Blood pressure 118/69, pulse 67, temperature 97.7  F (36.5  C), temperature source Oral, resp. rate 16, height 1.905 m (6' 3\"), weight 90 kg (198 lb 6.6 oz), SpO2 98 %.  Wt Readings from Last 4 Encounters:   12/18/19 90 kg (198 lb 6.6 oz)       No intake/output data recorded.      Constitutional: Awake, alert, cooperative, no apparent distress            Data:   All laboratory data and imaging studies reviewed.    CMP  Recent Labs   Lab 12/19/19  0834 12/18/19  1248    138   POTASSIUM 4.2 4.2   CHLORIDE 107 106   CO2 28 29   ANIONGAP 5 3   GLC 83 88   BUN 13 18   CR 1.12 1.09   GFRESTIMATED 73 76   GFRESTBLACK 85 88   PATRICIA 9.3 9.2   PROTTOTAL  --  8.3   ALBUMIN  --  3.9   BILITOTAL  --  0.5   ALKPHOS  --  81   AST  --  18   ALT  --  22     CBC  Recent Labs   Lab 12/18/19  1736 12/18/19  1248   WBC 6.8 7.1   RBC 4.32* 4.71   HGB 13.9 15.2   HCT 39.9* 43.3   MCV 92 92   MCH 32.2 32.3   MCHC 34.8 35.1   RDW 12.1 11.9    170                       "

## 2019-12-20 NOTE — CONSULTS
Medication coverage check for Eliquis or Xarelto. $60 monthly copay can be lowered to $10 monthly with copay supplement card. Will give this to patient.    Yessica Darden [facility]  Liaison Cell: [phone]

## 2019-12-21 LAB
ANION GAP SERPL CALCULATED.3IONS-SCNC: 4 MMOL/L (ref 3–14)
BASOPHILS # BLD AUTO: 0 10E9/L (ref 0–0.2)
BASOPHILS NFR BLD AUTO: 0.2 %
BUN SERPL-MCNC: 15 MG/DL (ref 7–30)
CALCIUM SERPL-MCNC: 9.1 MG/DL (ref 8.5–10.1)
CHLORIDE SERPL-SCNC: 106 MMOL/L (ref 94–109)
CO2 SERPL-SCNC: 28 MMOL/L (ref 20–32)
CREAT SERPL-MCNC: 1.15 MG/DL (ref 0.66–1.25)
DIFFERENTIAL METHOD BLD: ABNORMAL
EOSINOPHIL # BLD AUTO: 0.2 10E9/L (ref 0–0.7)
EOSINOPHIL NFR BLD AUTO: 4.7 %
ERYTHROCYTE [DISTWIDTH] IN BLOOD BY AUTOMATED COUNT: 11.9 % (ref 10–15)
GFR SERPL CREATININE-BSD FRML MDRD: 71 ML/MIN/{1.73_M2}
GLUCOSE SERPL-MCNC: 92 MG/DL (ref 70–99)
HCT VFR BLD AUTO: 40.9 % (ref 40–53)
HGB BLD-MCNC: 14.1 G/DL (ref 13.3–17.7)
IMM GRANULOCYTES # BLD: 0 10E9/L (ref 0–0.4)
IMM GRANULOCYTES NFR BLD: 0.2 %
LMWH PPP CHRO-ACNC: 0.4 IU/ML
LYMPHOCYTES # BLD AUTO: 0.7 10E9/L (ref 0.8–5.3)
LYMPHOCYTES NFR BLD AUTO: 14.8 %
MCH RBC QN AUTO: 31.3 PG (ref 26.5–33)
MCHC RBC AUTO-ENTMCNC: 34.5 G/DL (ref 31.5–36.5)
MCV RBC AUTO: 91 FL (ref 78–100)
MONOCYTES # BLD AUTO: 0.5 10E9/L (ref 0–1.3)
MONOCYTES NFR BLD AUTO: 10.3 %
NEUTROPHILS # BLD AUTO: 3.3 10E9/L (ref 1.6–8.3)
NEUTROPHILS NFR BLD AUTO: 69.8 %
PLATELET # BLD AUTO: 202 10E9/L (ref 150–450)
POTASSIUM SERPL-SCNC: 4.3 MMOL/L (ref 3.4–5.3)
RBC # BLD AUTO: 4.5 10E12/L (ref 4.4–5.9)
SODIUM SERPL-SCNC: 138 MMOL/L (ref 133–144)
WBC # BLD AUTO: 4.7 10E9/L (ref 4–11)

## 2019-12-21 PROCEDURE — 85027 COMPLETE CBC AUTOMATED: CPT | Performed by: INTERNAL MEDICINE

## 2019-12-21 PROCEDURE — 25000128 H RX IP 250 OP 636: Performed by: INTERNAL MEDICINE

## 2019-12-21 PROCEDURE — 85520 HEPARIN ASSAY: CPT | Performed by: INTERNAL MEDICINE

## 2019-12-21 PROCEDURE — 99232 SBSQ HOSP IP/OBS MODERATE 35: CPT | Performed by: INTERNAL MEDICINE

## 2019-12-21 PROCEDURE — 25000132 ZZH RX MED GY IP 250 OP 250 PS 637: Performed by: INTERNAL MEDICINE

## 2019-12-21 PROCEDURE — 85025 COMPLETE CBC W/AUTO DIFF WBC: CPT | Performed by: INTERNAL MEDICINE

## 2019-12-21 PROCEDURE — 90682 RIV4 VACC RECOMBINANT DNA IM: CPT | Performed by: INTERNAL MEDICINE

## 2019-12-21 PROCEDURE — 80048 BASIC METABOLIC PNL TOTAL CA: CPT | Performed by: INTERNAL MEDICINE

## 2019-12-21 PROCEDURE — 12000000 ZZH R&B MED SURG/OB

## 2019-12-21 PROCEDURE — 36415 COLL VENOUS BLD VENIPUNCTURE: CPT | Performed by: INTERNAL MEDICINE

## 2019-12-21 RX ADMIN — HEPARIN SODIUM 1750 UNITS/HR: 10000 INJECTION, SOLUTION INTRAVENOUS at 02:22

## 2019-12-21 RX ADMIN — FAMOTIDINE 20 MG: 20 TABLET, FILM COATED ORAL at 22:23

## 2019-12-21 RX ADMIN — INFLUENZA A VIRUS A/BRISBANE/02/2018 (H1N1) RECOMBINANT HEMAGGLUTININ ANTIGEN, INFLUENZA A VIRUS A/KANSAS/14/2017 (H3N2) RECOMBINANT HEMAGGLUTININ ANTIGEN, INFLUENZA B VIRUS B/PHUKET/3073/2013 RECOMBINANT HEMAGGLUTININ ANTIGEN, AND INFLUENZA B VIRUS B/MARYLAND/15/2016 RECOMBINANT HEMAGGLUTININ ANTIGEN 0.5 ML: 45; 45; 45; 45 INJECTION INTRAMUSCULAR at 18:20

## 2019-12-21 RX ADMIN — ACETAMINOPHEN 650 MG: 325 TABLET, FILM COATED ORAL at 16:39

## 2019-12-21 RX ADMIN — HEPARIN SODIUM 1750 UNITS/HR: 10000 INJECTION, SOLUTION INTRAVENOUS at 16:39

## 2019-12-21 RX ADMIN — FAMOTIDINE 20 MG: 20 TABLET, FILM COATED ORAL at 09:53

## 2019-12-21 RX ADMIN — ACETAMINOPHEN 650 MG: 325 TABLET, FILM COATED ORAL at 22:29

## 2019-12-21 ASSESSMENT — ACTIVITIES OF DAILY LIVING (ADL)
ADLS_ACUITY_SCORE: 11

## 2019-12-21 NOTE — PROGRESS NOTES
Oncology Chart Check:    - Continue IV Heparin for bilateral PEs  - Awaiting lupus anticoagulant and beta 2 glycoprotein antibody results  - If negative, could use NOAC or warfarin  - If positive, would use warfarin  - Dr. Merlos talked to the patient about repeating CT in 3-4 weeks to follow up on pleural based lung mass and consider a biopsy at that time    Please call with additional questions.    Xavi López M.D.  [facility]  [phone]

## 2019-12-21 NOTE — PLAN OF CARE
DATE & TIME: 12/20/19 1900-2300  Cognitive Concerns/ Orientation : A&O x 4   BEHAVIOR & AGGRESSION TOOL COLOR: Green     ABNL VS/O2: VSS on RA with LS CTA. BOURGEOIS at times.   MOBILITY: Independent  PAIN MANAGMENT: Headache managed with PRN Tylenol with some relief  DIET: Regular   BOWEL/BLADDER: Cont B/B. Voiding adequately  ABNL LAB/BG: Hep10a 0.37  DRAIN/DEVICES: PIV infusing Heparin at 17.5 ml/hr  TELEMETRY RHYTHM: NSR  SKIN: Intact  TESTS/PROCEDURES: Echo  D/C DAY/GOALS/PLACE: 1-2 days recommended to prior living arrangement once once on oral anticoagulation and if breathing continues to improve.  OTHER IMPORTANT INFO: Hemo/Onco following

## 2019-12-21 NOTE — PLAN OF CARE
DATE & TIME: 12/20   Cognitive Concerns/ Orientation : alert and oriented   BEHAVIOR & AGGRESSION TOOL COLOR: green   CIWA SCORE: NA   ABNL VS/O2: VSS on room air 92%  MOBILITY: independent  PAIN MANAGMENT: denies   DIET: Regular   BOWEL/BLADDER: independent   ABNL LAB/BG: XA .37 on 12/20   DRAIN/DEVICES: IV  TELEMETRY RHYTHM: Sinus zeyad  SKIN: WDL  TESTS/PROCEDURES: ECHO 12/20  D/C DAY/GOALS/PLACE: pending   OTHER IMPORTANT INFO:

## 2019-12-21 NOTE — PROGRESS NOTES
[facility]    Medicine Progress Note - Hospitalist Service       Date of Admission:  12/18/2019  Assessment & Plan   Scar Easley is a pleasant 54 year old gentleman who has been relatively healthy, who presented 12/18/2019  for evaluation of recent-onset shortness of breath and dyspnea with exertion and found with bilateral pulmonary emboli and left lung nodular opacity.     Bilateral pulmonary emboli.  Left lower extremity DVT.  Possible right heart strain related to above.  CT chest 12/18 showed large bilateral pulmonary emboli with possible right heart strain. Started on heparin gtt on admit. No definitive provoking factors, though had short plane ride to [place] and back during Thanksgiving time. Hematology-Oncology consulted.  Hereditary thrombophilia not suspected. Antiphospholipid antibody panel ordered 12/19. Bilateral lower extremity US 12/19 showed DVT in L popliteal vein.  * Echo on 12/20/19 RV mildly dilated with nl systolic fxn, mild to moderate HTN. LV EF nl 55-60%. Flattened septum c/w RV pressure overload.     - Continue heparin gtt.  - Lupus anticoagulant and beta 2 glycoprotein antibody results still pending on 12/21/19. If positive > discharge on lovenox + coumadin. If negative start NOAC  - Appreciate help from ANTONY     Possible left lung mass.  CT 12/18 also showed nodular airspace opacity in the medial left lung base could be related to infection or atelectasis, malignancy cannot be entirely excluded. Afebrile, WBC normal on admit. Hematology-Oncology consulted. Felt too high risk to hold anticoagulation for biopsy now.    - Plan repeat CT outpatient in a few weeks and reconsider need for biopsy at this time.  - Appreciate help from ANTONY.     Sore throat, possible URI.  - Continue PRN cepachol lozenges.  - Patient may have flu shot.      Headache.  - Continue PRN acetaminophen,      GERD.  Started famotidine this hospitalization.  - Continue famotidine.      Diet:  "Regular Diet Adult    DVT Prophylaxis: on heparin  Ruffin Catheter: not present  Code Status: Full Code      Disposition Plan   Expected discharge: 1-2 days, recommended to prior living arrangement once results back and plan determined. .  Entered: Stephanie Calvin MD 12/21/2019, 5:18 PM       The patient's care was discussed with the Bedside Nurse and Patient.    Stephanie Calvin MD  Hospitalist Service  [facility]    ______________________________________________________________________    Interval History   Patient had several questions about current care to this point which were answered in full, and he was very appreciative.   He states breathing and \"chest congestion\" is much better. Denies chest pain, abdominal pain, N/V.     Data reviewed today: I reviewed all medications, new labs and imaging results over the last 24 hours. I personally reviewed no images or EKG's today.    Physical Exam   Vital Signs: Temp: 97.5  F (36.4  C) Temp src: Oral BP: 128/78 Pulse: 61   Resp: 18 SpO2: 94 % O2 Device: None (Room air)    Weight: 198 lbs 6.62 oz  Constitutional: NAD,   Neuropsyche: alert and oriented, answers questions appropriately.   Respiratory:  breathing comfortably, good air exchange, no wheezes, no crackles.   Cardiovascular:  regular rate and rhythm, no edema.  GI:  soft, NT/ND, BS normal  Skin/Integumen:  no acute rash, bruising or sign of bleeding.     Data   Recent Labs   Lab 12/21/19  1010 12/19/19  0834 12/18/19  1736 12/18/19  1248   WBC 4.7  --  6.8 7.1   HGB 14.1  --  13.9 15.2   MCV 91  --  92 92     --  152 170    140  --  138   POTASSIUM 4.3 4.2  --  4.2   CHLORIDE 106 107  --  106   CO2 28 28  --  29   BUN 15 13  --  18   CR 1.15 1.12  --  1.09   ANIONGAP 4 5  --  3   PATRICIA 9.1 9.3  --  9.2   GLC 92 83  --  88   ALBUMIN  --   --   --  3.9   PROTTOTAL  --   --   --  8.3   BILITOTAL  --   --   --  0.5   ALKPHOS  --   --   --  81   ALT  --   --   --  22   AST  --   --   --  " 18   LIPASE  --   --   --  88   TROPI  --   --   --  <0.015     No results found for this or any previous visit (from the past 24 hour(s)).  Medications     heparin 100 unit/mL in 0.45% NaCl 1,750 Units/hr (12/21/19 5831)     - MEDICATION INSTRUCTIONS -         famotidine  20 mg Oral BID     influenza vaccine adult (product based on age)  0.5 mL Intramuscular Prior to discharge     sodium chloride (PF)  3 mL Intracatheter Q8H

## 2019-12-21 NOTE — PLAN OF CARE
6983-2330: Patient is A/O x4. Independent. VSS, RA. IV hep @17.5. 10a in AM. Lung sounds clear throughout. States some SOB all the time. Denies pain this shift. Regular diet, tolerated well. Tele NSR. Discharge pending, likely couple of days.

## 2019-12-22 VITALS
RESPIRATION RATE: 16 BRPM | SYSTOLIC BLOOD PRESSURE: 115 MMHG | WEIGHT: 198.41 LBS | OXYGEN SATURATION: 93 % | DIASTOLIC BLOOD PRESSURE: 75 MMHG | HEART RATE: 76 BPM | HEIGHT: 75 IN | TEMPERATURE: 98 F | BODY MASS INDEX: 24.67 KG/M2

## 2019-12-22 LAB — LMWH PPP CHRO-ACNC: 0.36 IU/ML

## 2019-12-22 PROCEDURE — 36415 COLL VENOUS BLD VENIPUNCTURE: CPT | Performed by: INTERNAL MEDICINE

## 2019-12-22 PROCEDURE — 25000132 ZZH RX MED GY IP 250 OP 250 PS 637: Performed by: INTERNAL MEDICINE

## 2019-12-22 PROCEDURE — 25000128 H RX IP 250 OP 636: Performed by: INTERNAL MEDICINE

## 2019-12-22 PROCEDURE — 99239 HOSP IP/OBS DSCHRG MGMT >30: CPT | Performed by: INTERNAL MEDICINE

## 2019-12-22 PROCEDURE — 85520 HEPARIN ASSAY: CPT | Performed by: INTERNAL MEDICINE

## 2019-12-22 RX ORDER — HEPARIN SODIUM 10000 [USP'U]/100ML
1750 INJECTION, SOLUTION INTRAVENOUS CONTINUOUS
Status: ACTIVE | OUTPATIENT
Start: 2019-12-22 | End: 2019-12-22

## 2019-12-22 RX ORDER — HEPARIN SODIUM 10000 [USP'U]/100ML
1750 INJECTION, SOLUTION INTRAVENOUS CONTINUOUS
Status: DISCONTINUED | OUTPATIENT
Start: 2019-12-22 | End: 2019-12-22

## 2019-12-22 RX ADMIN — FAMOTIDINE 20 MG: 20 TABLET, FILM COATED ORAL at 09:29

## 2019-12-22 RX ADMIN — HEPARIN SODIUM 1750 UNITS/HR: 10000 INJECTION, SOLUTION INTRAVENOUS at 07:54

## 2019-12-22 RX ADMIN — ENOXAPARIN SODIUM 90 MG: 100 INJECTION SUBCUTANEOUS at 15:57

## 2019-12-22 ASSESSMENT — ACTIVITIES OF DAILY LIVING (ADL)
ADLS_ACUITY_SCORE: 11

## 2019-12-22 NOTE — PROGRESS NOTES
Hematology Chart Check:    - No new recommendations  - Continue IV Heparin for bilateral PEs  - Still awaiting lupus anticoagulant and beta 2 glycoprotein antibody results.  Not sure when these results will be available.  - If negative, could use NOAC or warfarin  - If positive, would use warfarin  - Dr. Merlos talked to the patient about repeating CT in 3-4 weeks to follow up on pleural based lung mass and consider a biopsy at that time  - If patient does not wish to wait in the hospital for lab results to come back, could send him out on Lovenox bridging to Coumadin.  If no antiphospholipid antibodies, could always switch to Xarelto or Eliquis in the outpatient setting.     Please call with additional questions.     Xavi López M.D.  [facility]  [phone]

## 2019-12-22 NOTE — PLAN OF CARE
DATE & TIME: 2300-0730                 Cognitive Concerns/ Orientation : A&Ox4, calm, cooperative   BEHAVIOR & AGGRESSION TOOL COLOR: green  CIWA SCORE: n/a     ABNL VS/O2: VSS on RA, zeyad   MOBILITY: Ind  PAIN MANAGMENT: Denied, Tylenol on eves  DIET: Reg  BOWEL/BLADDER: WDL  ABNL LAB/BG: Hep10A 0.4, recheck this am 0600  DRAIN/DEVICES: PIV infusing Heparin at 1750 units/hr  TELEMETRY RHYTHM: Sinus Zeyad  SKIN: WDL  TESTS/PROCEDURES: n/a  D/C DAY/GOALS/PLACE: Awaiting Lupus work-up  OTHER IMPORTANT INFO: Hem/Onc following. C/o congestion

## 2019-12-22 NOTE — PLAN OF CARE
VSS. No pain reported. No SOB. Tele NSR. Up independently in room. Discharge home. Plan to  lovenox, teaching providing. Emily Barragan called to ensure pt will be able to  medication. All questions answered. All belongings sent with patient.

## 2019-12-22 NOTE — DISCHARGE SUMMARY
[facility]  Hospitalist Discharge Summary       Date of Admission:  12/18/2019  Date of Discharge:  12/22/2019  Discharging Provider: Stephanie Calvin MD      Discharge Diagnoses   Bilateral pulmonary emboli.  Left lower extremity DVT.  Possible right heart strain related to above  Possible left lung mass.    Follow-ups Needed After Discharge   Follow-up Appointments     Follow-up and recommended labs and tests       Follow up with Dr. Merlos , at [facility] tomorrow to follow   up on lab results and develop a plan regarding anticoagulation and follow   up imaging of your lungs.   You should remain on lovenox until instructed by Dr. Merlos. If pending   blood tests come come back positive, you will be started on coumadin and   will continue lovenox until your INR is greater than 2. If your lab tests   come back negative, Dr. Mendoza will stop lovenox and start Xarelto.             Unresulted Labs Ordered in the Past 30 Days of this Admission     Date and Time Order Name Status Description    12/20/2019 0000 Beta 2 Glycoprotein Antibodies IGG IGM In process     12/20/2019 0000 Lupus Anticoagulant Panel In process       These results will be followed up by Dr. Merlos    Discharge Disposition   Discharged to home  Condition at discharge: Good    Hospital Course   Scar Easley is a pleasant 54 year old gentleman who has been relatively healthy, who presented 12/18/2019  for evaluation of recent-onset shortness of breath and dyspnea with exertion and found with bilateral pulmonary emboli and left lung nodular opacity.     Bilateral pulmonary emboli.  Left lower extremity DVT.  Possible right heart strain related to above.  CT chest 12/18 showed large bilateral pulmonary emboli with possible right heart strain. Started on heparin gtt on admit. No definitive provoking factors, though had short plane ride to [place] and back during Thanksgiving time. Hematology-Oncology consulted.   Hereditary thrombophilia not suspected. Antiphospholipid antibody panel ordered 12/19. Bilateral lower extremity US 12/19 showed DVT in L popliteal vein.  * Echo on 12/20/19 RV mildly dilated with nl systolic fxn, mild to moderate HTN. LV EF nl 55-60%. Flattened septum c/w RV pressure overload.     - Treated with heparin gtt during his stay.   - Lupus anticoagulant and beta 2 glycoprotein antibody results still pending on 12/22/19. If positive > lovenox + coumadin. If negative start NOAC  - Discussed with hematology on 12/22/19. Patient can discharge on lovenox with follow up with Dr. Merlos regarding lab tests and plan for anticoagulation.      Nodular Airspace Opacity in the Medial Lung Base  CT 12/18 also showed nodular airspace opacity in the medial left lung base could be related to infection or atelectasis, malignancy cannot be entirely excluded. Afebrile, WBC normal on admit. Hematology-Oncology consulted. Felt too high risk to hold anticoagulation for biopsy now.    - Plan repeat CT outpatient in a few weeks and reconsider need for biopsy at this time.     GERD.  Started famotidine this hospitalization.  - Continue famotidine.     Communication: Discussed with Hematology, patient and RN on 12/22/19    Consultations This Hospital Stay   PHARMACY TO DOSE HEPARIN  HEMATOLOGY & ONCOLOGY IP CONSULT  PHARMACY LIAISON FOR MEDICATION COVERAGE CONSULT    Code Status   Full Code    Time Spent on this Encounter   I, Stephanie Calvin MD, personally saw the patient today and spent greater than 30 minutes discharging this patient, due to time in coordination of care with hematology.        Stephanie Calvin MD  [facility]  ______________________________________________________________________    Physical Exam   Vital Signs: Temp: 98  F (36.7  C) Temp src: Oral BP: 123/77 Pulse: 54   Resp: 16 SpO2: 95 % O2 Device: None (Room air)    Weight: 198 lbs 6.62 oz  Constitutional:  NAD,   Neuropsyche:  alert and  oriented, answers questions appropriately.   Respiratory:  Breathing comfortably, good air exchange, no wheezes, no crackles.   Cardiovascular:  Regular rate and rhythm, no edema.  GI:  soft, NT/ND, BS normal  Skin/Integumen:  No acute rash, bruising or sign of bleeding.          Primary Care Physician   Jose Cruz Edwards    Discharge Orders      Reason for your hospital stay    You were admitted with clots in your lung and left leg. We also found a nodule in your lung. This should be followed up with repeat imaging and possible biopsy in the next 3 weeks. Dr. Merlos will arrange this.     Follow-up and recommended labs and tests     Follow up with Dr. Merlos , at [facility] tomorrow to follow up on lab results and develop a plan regarding anticoagulation and follow up imaging of your lungs.   You should remain on lovenox until instructed by Dr. Merlos. If pending blood tests come come back positive, you will be started on coumadin and will continue lovenox until your INR is greater than 2. If your lab tests come back negative, Dr. Mendoza will stop lovenox and start Xarelto.     Activity    Your activity upon discharge: activity as tolerated. Avoid activities that place you at high risk of trauma while on blood thinner.     Diet    Regular Diet Adult       Significant Results and Procedures   Most Recent 3 CBC's:  Recent Labs   Lab Test 12/21/19  1010 12/18/19  1736 12/18/19  1248   WBC 4.7 6.8 7.1   HGB 14.1 13.9 15.2   MCV 91 92 92    152 170     Most Recent 3 BMP's:  Recent Labs   Lab Test 12/21/19  1010 12/19/19  0834 12/18/19  1248    140 138   POTASSIUM 4.3 4.2 4.2   CHLORIDE 106 107 106   CO2 28 28 29   BUN 15 13 18   CR 1.15 1.12 1.09   ANIONGAP 4 5 3   PATRICIA 9.1 9.3 9.2   GLC 92 83 88     Most Recent 2 LFT's:  Recent Labs   Lab Test 12/18/19  1248   AST 18   ALT 22   ALKPHOS 81   BILITOTAL 0.5     Most Recent 3 INR's:No lab results found.,   Results for orders placed or  performed during the hospital encounter of 12/18/19   CT Chest Pulmonary Embolism w Contrast     Value    Radiologist flags Pulmonary embolism (AA)    Narrative    CT CHEST PULMONARY EMBOLISM WITH CONTRAST December 18, 2019 2:19 PM     HISTORY: Shortness of breath.    TECHNIQUE: 73mL Isovue-370. Radiation dose for this scan was reduced  using automated exposure control, adjustment of the mA and/or kV  according to patient size, or iterative reconstruction technique.    COMPARISON: None.    FINDINGS: There are large bilateral pulmonary emboli affecting lobar  and segmental pulmonary arteries. There is prominence of the right  ventricle, raising the possibility of right heart strain.    The aorta is unremarkable. No mediastinal, hilar, or axillary  adenopathy. No pneumothorax. No pleural or pericardial effusion. There  is a 6 mm nodule in the right minor fissure (series 8, image 182).  Another small pleural-based nodule in the anterior right middle lobe  measures 4 mm (series 8, image 196). There is nodular airspace opacity  in the medial left lung base that measures up to 3.1 x 1.5 cm (series  8, image 294).    No worrisome abnormality in the visualized portions of the upper  abdomen. Visualized bones are unremarkable.      Impression    IMPRESSION:  1. Large bilateral pulmonary emboli with possible right heart strain.  2. Nodular airspace opacity in the medial left lung base could be  related to infection or atelectasis. Malignancy cannot be entirely  excluded. Consider PET/CT.    [Critical Result: Pulmonary embolism]    Finding was identified on 12/18/2019 2:20 PM.     Dr. Phelan was contacted by me on 12/18/2019 2:25 PM and verbalized  understanding of the critical result.     STACY WOODARD MD   US Lower Extremity Venous Duplex Bilateral    Narrative    VENOUS ULTRASOUND BILATERAL LOWER EXTREMITIES  12/19/2019 10:20 AM     HISTORY: Rule out DVT, evaluate recent left lower leg pain, in setting  of new  bilateral PEs and elevated D-dimer.    COMPARISON: None.    TECHNIQUE: Color Doppler and spectral waveform analysis performed  throughout the deep veins of both lower extremities.    FINDINGS: Both common femoral, proximal greater saphenous, and femoral  veins demonstrate normal blood flow, compression, and augmentation.  Right popliteal vein is patent. Nonocclusive thrombus in the left  popliteal vein. Both posterior tibial and peroneal veins are  compressible.      Impression    IMPRESSION:   1. Positive for nonocclusive short segment DVT in the left popliteal  vein. Thrombus is somewhat hypoechoic suggesting acuity, though  difficult determination to make on ultrasound images alone.  2. Negative for deep venous thrombosis in the right lower extremity.    ZACK PLASCENCIA MD   Echocardiogram Complete    Narrative    [id number]  [id number]  [id number]  [id number]^ALEXIS^MATTHEW^ELIZABETH           [facility]  Echocardiography Laboratory  [address] [place] [address]        Name: JAYY ARNOLD  MRN: [id number]  : 1965  Study Date: 2019 12:16 PM  Age: 54 yrs  Gender: Male  Patient Location: [facility]  Reason For Study: Pulmonary Emboli  Ordering Physician: MATTHEW ESPINOZA  Referring Physician: CJ HINOJOSA  Performed By: Les Phipps RDCS     BSA: 2.2 m2  Height: 75 in  Weight: 198 lb  HR: 63  BP: 94/67 mmHg  _____________________________________________________________________________  __        Procedure  Complete Echo Adult.  _____________________________________________________________________________  __        Interpretation Summary     The right ventricle is mildly dilated.  The right ventricular systolic function is normal.  There is mild (1+) tricuspid regurgitation.  Right ventricular systolic pressure is elevated, consistent with mild to  moderate pulmonary hypertension.  Left ventricular systolic function is normal.  The visual ejection fraction is estimated at  55-60%.  Flattened septum is consistent with RV pressure overload.  There is no comparison study available.  _____________________________________________________________________________  __        Left Ventricle  The left ventricle is normal in size. There is normal left ventricular wall  thickness. Left ventricular systolic function is normal. The visual ejection  fraction is estimated at 55-60%. Left ventricular diastolic function is  normal. Flattened septum is consistent with RV pressure overload.     Right Ventricle  The right ventricle is mildly dilated. The right ventricular systolic function  is normal.     Atria  There is mild biatrial enlargement. There is no atrial shunt seen.     Mitral Valve  The mitral valve is normal in structure and function. There is trace mitral  regurgitation.        Tricuspid Valve  There is mild (1+) tricuspid regurgitation. The right ventricular systolic  pressure is approximated at 37.6 mmHg plus the right atrial pressure. IVC  diameter <2.1 cm collapsing >50% with sniff suggests a normal RA pressure of 3  mmHg. Right ventricular systolic pressure is elevated, consistent with mild to  moderate pulmonary hypertension.     Aortic Valve  The aortic valve is trileaflet with aortic valve sclerosis. There is trace  aortic regurgitation. No aortic stenosis is present.     Pulmonic Valve  The pulmonic valve is not well seen, but is grossly normal. There is trace  pulmonic valvular regurgitation.     Vessels  Normal size aorta. The inferior vena cava was normal in size with preserved  respiratory variability.     Pericardium  There is no pericardial effusion.     _____________________________________________________________________________  __  MMode/2D Measurements & Calculations  IVSd: 1.2 cm  LVIDd: 5.6 cm  LVIDs: 3.8 cm  LVPWd: 1.0 cm  FS: 31.9 %  LV mass(C)d: 247.4 grams  LV mass(C)dI: 113.2 grams/m2     Ao root diam: 3.3 cm  LA dimension: 3.7 cm  asc Aorta Diam: 2.8 cm  LA/Ao:  1.1  LVOT diam: 2.3 cm  LVOT area: 4.1 cm2  LA Volume (BP): 81.8 ml  LA Volume Index (BP): 37.4 ml/m2  RWT: 0.37        Doppler Measurements & Calculations  MV E max dario: 55.5 cm/sec  MV A max dario: 40.2 cm/sec  MV E/A: 1.4  MV dec time: 0.29 sec     Ao V2 max: 142.5 cm/sec  Ao max P.0 mmHg  Ao V2 mean: 102.4 cm/sec  Ao mean P.7 mmHg  Ao V2 VTI: 28.5 cm  KINA(I,D): 3.1 cm2  KINA(V,D): 3.0 cm2  LV V1 max P.4 mmHg  LV V1 max: 104.4 cm/sec  LV V1 VTI: 21.8 cm  SV(LVOT): 89.6 ml  SI(LVOT): 41.0 ml/m2  PA acc time: 0.10 sec  TR max dario: 306.5 cm/sec  TR max P.6 mmHg  AV Dario Ratio (DI): 0.73  KINA Index (cm2/m2): 1.4           _____________________________________________________________________________  __           Report approved by: Roc Phan 2019 03:10 PM            Discharge Medications   Current Discharge Medication List      START taking these medications    Details   enoxaparin ANTICOAGULANT (LOVENOX) 100 MG/ML syringe Inject 0.9 mLs (90 mg) Subcutaneous every 12 hours for 8 doses  Qty: 7.2 mL, Refills: 0    Associated Diagnoses: Acute pulmonary embolism, unspecified pulmonary embolism type, unspecified whether acute cor pulmonale present (H)         STOP taking these medications       multivitamin w/minerals (THERA-VIT-M) tablet Comments:   Reason for Stopping:             Allergies   No Known Allergies

## 2019-12-22 NOTE — PLAN OF CARE
DATE & TIME: 12/21 2345-0948  Cognitive Concerns/ Orientation : A&Ox4   BEHAVIOR & AGGRESSION TOOL COLOR: green   CIWA SCORE: NA   ABNL VS/O2: VSS on room air 96%, except bradycardia to 50s at times  MOBILITY: independent  PAIN MANAGMENT: denies   DIET: Regular   BOWEL/BLADDER: independent   ABNL LAB/BG: [id number]-0.40.   DRAIN/DEVICES: PIV infusing heparin gtt 17.5ml/hr. Hep10A recheck next AM.   TELEMETRY RHYTHM: NSR  SKIN: WDL  TESTS/PROCEDURES:none  D/C DAY/GOALS/PLACE: Awaiting lupus anticoagulant and beta 2 glycoprotein antibody results. Discharge pending lab results.   OTHER IMPORTANT INFO:

## 2019-12-23 LAB
B2 GLYCOPROT1 IGG SERPL IA-ACNC: 26 U/ML
B2 GLYCOPROT1 IGM SERPL IA-ACNC: <2.9 U/ML

## 2019-12-24 LAB — LA PPP-IMP: NEGATIVE

## 2020-11-29 ENCOUNTER — HEALTH MAINTENANCE LETTER (OUTPATIENT)
Age: 55
End: 2020-11-29

## 2021-05-29 ENCOUNTER — HOSPITAL ENCOUNTER (EMERGENCY)
Facility: CLINIC | Age: 56
Discharge: HOME | End: 2021-05-30
Attending: EMERGENCY MEDICINE | Admitting: EMERGENCY MEDICINE
Payer: COMMERCIAL

## 2021-05-29 DIAGNOSIS — M54.10 BACK PAIN WITH LEFT-SIDED RADICULOPATHY: ICD-10-CM

## 2021-05-29 PROCEDURE — 99285 EMERGENCY DEPT VISIT HI MDM: CPT | Mod: 25

## 2021-05-29 ASSESSMENT — MIFFLIN-ST. JEOR: SCORE: 1817.82

## 2021-05-30 VITALS
HEART RATE: 52 BPM | BODY MASS INDEX: 24.87 KG/M2 | HEIGHT: 75 IN | RESPIRATION RATE: 16 BRPM | OXYGEN SATURATION: 96 % | SYSTOLIC BLOOD PRESSURE: 145 MMHG | TEMPERATURE: 98.2 F | WEIGHT: 200 LBS | DIASTOLIC BLOOD PRESSURE: 87 MMHG

## 2021-05-30 PROCEDURE — 250N000011 HC RX IP 250 OP 636: Performed by: EMERGENCY MEDICINE

## 2021-05-30 PROCEDURE — 96374 THER/PROPH/DIAG INJ IV PUSH: CPT

## 2021-05-30 PROCEDURE — 250N000013 HC RX MED GY IP 250 OP 250 PS 637: Performed by: EMERGENCY MEDICINE

## 2021-05-30 RX ORDER — METHOCARBAMOL 750 MG/1
750 TABLET, FILM COATED ORAL 4 TIMES DAILY PRN
Qty: 20 TABLET | Refills: 0 | Status: ON HOLD | OUTPATIENT
Start: 2021-05-30 | End: 2023-11-24

## 2021-05-30 RX ORDER — MORPHINE SULFATE 4 MG/ML
4 INJECTION, SOLUTION INTRAMUSCULAR; INTRAVENOUS ONCE
Status: COMPLETED | OUTPATIENT
Start: 2021-05-30 | End: 2021-05-30

## 2021-05-30 RX ORDER — OXYCODONE AND ACETAMINOPHEN 5; 325 MG/1; MG/1
1 TABLET ORAL EVERY 6 HOURS PRN
Qty: 10 TABLET | Refills: 0 | Status: ON HOLD | OUTPATIENT
Start: 2021-05-30 | End: 2023-11-24

## 2021-05-30 RX ORDER — DIAZEPAM 5 MG
5 TABLET ORAL ONCE
Status: COMPLETED | OUTPATIENT
Start: 2021-05-30 | End: 2021-05-30

## 2021-05-30 RX ORDER — ACETAMINOPHEN 500 MG
1000 TABLET ORAL ONCE
Status: COMPLETED | OUTPATIENT
Start: 2021-05-30 | End: 2021-05-30

## 2021-05-30 RX ADMIN — MORPHINE SULFATE 4 MG: 4 INJECTION, SOLUTION INTRAMUSCULAR; INTRAVENOUS at 00:28

## 2021-05-30 RX ADMIN — DIAZEPAM 5 MG: 5 TABLET ORAL at 00:27

## 2021-05-30 RX ADMIN — ACETAMINOPHEN 1000 MG: 500 TABLET ORAL at 00:29

## 2021-05-30 ASSESSMENT — ENCOUNTER SYMPTOMS
BACK PAIN: 1
WEAKNESS: 0
NUMBNESS: 0
ABDOMINAL PAIN: 0
BLOOD IN STOOL: 0

## 2021-05-30 NOTE — ED TRIAGE NOTES
Diagnosis of herniated disks last Thursday. Back has been increasing in pain, harder to move around. Left leg pain with numbness.  Medications given not helping anymore. Rates pain 9/10

## 2021-05-30 NOTE — ED PROVIDER NOTES
History     Chief Complaint:  Back Pain and Leg Pain       HPI  Scar Easley is a 56 year old male with a history of a PE and a lumbar herniated disc who presents for evaluation of back and leg pain. The patient had a recent MRI and was seen yesterday at [facility]. He has spinal injections scheduled on Tuesday (6/1/21). He has been taking Norco, gabapentin, flexeril, and ibuprofen for his pain with minimal relief. He has been taking 3 tablets of ibuprofen every 3 hours. Today he was unable to get out of bed and ambulate due to his symptoms. His left leg pain radiates down his leg. He denies any abdominal pain, weakness, numbness, black or bloody stools, or loss of bladder and bowel control. Of note, he is signing up for physical therapy.       MR SPINE LUMBAR WO (05/20/2021 5:46 PM CDT)  A lumbarized S1 segment. Normal lumbar lordotic curve. No compression fracture or aggressive marrow signal abnormality. Multilevel disc degeneration, advanced at L4-5/L5-S1 and mild to moderate at the remaining levels. Lower cord/conus signal is normal. The conus terminates at a normal location. No intradural lesion. Exophytic left renal cyst. No retroperitoneal/paraspinous soft tissue mass.       Review of Systems   Gastrointestinal: Negative for abdominal pain and blood in stool.   Genitourinary:        No loss of bowel or bladder control   Musculoskeletal: Positive for back pain.   Neurological: Negative for weakness and numbness.   All other systems reviewed and are negative.    Allergies:  No Known Allergies    Medications:   Norco  Flexeril  Ibuprofen  Gabapentin     Medical History:   PE  Lumbar herniated disc    Family History:    Alcohol abuse    Social History:  The patient was unaccompanied to the ED.  PCP: Jose Cruz Edwards    Physical Exam     Patient Vitals for the past 24 hrs:   BP Temp Temp src Pulse Resp SpO2 Height Weight   05/30/21 0200 -- -- -- -- -- 96 % -- --   05/30/21 0130 -- -- -- -- -- 95 % -- --   05/30/21  "0115 -- -- -- -- -- 96 % -- --   05/30/21 0100 -- -- -- -- -- 94 % -- --   05/30/21 0045 -- -- -- -- -- 95 % -- --   05/30/21 0030 -- -- -- -- -- 98 % -- --   05/29/21 2308 (!) 145/87 98.2  F (36.8  C) Temporal 52 16 100 % 1.905 m (6' 3\") 90.7 kg (200 lb)      Physical Exam  Head:               The scalp, face, and head appear normal and symmetric  Eyes:               Sclera white; pupils equal  ENT:                External ears and nares normal  CV:                  Regular rate and rhythm  No murmur   Resp:               Breath sounds clear and equal bilaterally  GI:                   Abdomen is soft, non-tender, non-distended  MS:                  Moves all extremities                          No midline or paraspinal back tenderness  Neuro:             Speech is normal and fluent.                           Staying in RLD position.  No tenderness in gluteus muscles.  No loss of sensation or strength.  Pain w/movements and repositioning.    Emergency Department Course   Emergency Department Course:  Reviewed:  12:02 AM I reviewed nursing notes, vitals, past medical history and care everywhere    Assessments:  0002 I obtained history and examined the patient as noted above.   0200 I rechecked the patient and explained findings.     Interventions:  0028 morphine 4 mg IV  0029 Tylenol 1000 mg PO   0027 Valium 5 mg PO    Disposition:  The patient was discharged to home.   Impression & Plan   Medical Decision Making:  Scar Easley is a 56 year old male who presents for evaluation of uncontrolled symptoms of left-sided radiculopathy. He is not having any weakness, numbness, or symptoms of cauda equina. Recent MRI was reviewed showing nerve impingement. He already has appropriate follow-up nerve injection and physical therapy. Given that his symptoms are worsening additional medications were given here. This resulted in improvement though symptoms persist. He will have a temporary change in his medications at home " through the weekend until his injection is completed.       Diagnosis:     ICD-10-CM    1. Back pain with left-sided radiculopathy  M54.10         Discharge Medications:  Discharge Medication List as of 5/30/2021  1:56 AM      START taking these medications    Details   methocarbamol (ROBAXIN) 750 MG tablet Take 1 tablet (750 mg) by mouth 4 times daily as needed (muscle pain/spasm), Disp-20 tablet, R-0, Local Print      oxyCODONE-acetaminophen (PERCOCET) 5-325 MG tablet Take 1 tablet by mouth every 6 hours as needed for moderate to severe pain, Disp-10 tablet, R-0, Local Print             Scribe Disclosure:  I, Pily Braxton, am serving as a scribe at 12:02 AM on 5/30/2021 to document services personally performed by Franny Perez Md based on my observations and the provider's statements to me.     [facility] Franny Urbina MD  05/30/21 0613

## 2021-05-30 NOTE — DISCHARGE INSTRUCTIONS
Prescription strength dosing instructions:  - 600mg ibuprofen (Advil, Motrin) every 6 hours as needed for fever/pain/inflammation.  Naprosyn (Naproxen, Aleve) works similarly and can be used instead, if preferred.  - 650mg acetaminophen (tylenol) every 4 hours or 1000mg every 6 hours (maximum of 4000mg in 24 hours).  Acetaminophen is often in combination over the counter and prescription pain medications.  Be sure to include this in daily total.    These medications work differently and can be used in combination.      Discharge Instructions  Back Pain  You were seen today for back pain. Back pain can have many causes, but most will get better without surgery or other specific treatment. Sometimes there is a herniated ( slipped ) disc. We do not usually do MRI scans to look for these right away, since most herniated discs will get better on their own with time.  Today, we did not find any evidence that your back pain was caused by a serious condition. However, sometimes symptoms develop over time and cannot be found during an emergency visit, so it is very important that you follow up with your primary provider.  Generally, every Emergency Department visit should have a follow-up clinic visit with either a primary or a specialty clinic/provider. Please follow-up as instructed by your emergency provider today.    Return to the Emergency Department if:  You develop a fever with your back pain.   You have weakness or change in sensation in one or both legs.  You lose control of your bowels or bladder, or cannot empty your bladder (cannot pee).  Your pain gets much worse.     Follow-up with your provider:  Unless your pain has completely gone away, please make an appointment with your provider within one week. Most of the routine care for back pain is available in a clinic and not the Emergency Department. You may need further management of your back pain, such as more pain medication, imaging such as an X-ray or MRI,  or physical therapy.    What can I do to help myself?  Remain Active -- People are often afraid that they will hurt their back further or delay recovery by remaining active, but this is one of the best things you can do for your back. In fact, staying in bed for a long time to rest is not recommended. Studies have shown that people with low back pain recover faster when they remain active. Movement helps to bring blood flow to the muscles and relieve muscle spasms as well as preventing loss of muscle strength.  Heat -- Using a heating pad can help with low back pain during the first few weeks. Do not sleep with a heating pad, as you can be burned.   Pain medications - You may take a pain medication such as Tylenol  (acetaminophen), Advil , Motrin  (ibuprofen) or Aleve  (naproxen).  If you were given a prescription for medicine here today, be sure to read all of the information (including the package insert) that comes with your prescription.  This will include important information about the medicine, its side effects, and any warnings that you need to know about.  The pharmacist who fills the prescription can provide more information and answer questions you may have about the medicine.  If you have questions or concerns that the pharmacist cannot address, please call or return to the Emergency Department.   Remember that you can always come back to the Emergency Department if you are not able to see your regular provider in the amount of time listed above, if you get any new symptoms, or if there is anything that worries you.

## 2021-09-25 ENCOUNTER — HEALTH MAINTENANCE LETTER (OUTPATIENT)
Age: 56
End: 2021-09-25

## 2022-01-15 ENCOUNTER — HEALTH MAINTENANCE LETTER (OUTPATIENT)
Age: 57
End: 2022-01-15

## 2022-12-26 ENCOUNTER — HEALTH MAINTENANCE LETTER (OUTPATIENT)
Age: 57
End: 2022-12-26

## 2023-11-22 ENCOUNTER — LAB REQUISITION (OUTPATIENT)
Dept: LAB | Facility: CLINIC | Age: 58
End: 2023-11-22
Payer: COMMERCIAL

## 2023-11-22 DIAGNOSIS — M25.529 PAIN IN UNSPECIFIED ELBOW: ICD-10-CM

## 2023-11-22 LAB
APPEARANCE FLD: ABNORMAL
CELL COUNT BODY FLUID SOURCE: ABNORMAL
COLOR FLD: YELLOW
GRAM STAIN RESULT: ABNORMAL
GRAM STAIN RESULT: ABNORMAL
LYMPHOCYTES NFR FLD MANUAL: NORMAL %
MONOS+MACROS NFR FLD MANUAL: NORMAL %
NEUTS BAND NFR FLD MANUAL: 100 %
WBC # FLD AUTO: ABNORMAL /UL

## 2023-11-22 PROCEDURE — 89051 BODY FLUID CELL COUNT: CPT | Mod: ORL | Performed by: ORTHOPAEDIC SURGERY

## 2023-11-22 PROCEDURE — 87205 SMEAR GRAM STAIN: CPT | Mod: ORL | Performed by: ORTHOPAEDIC SURGERY

## 2023-11-22 PROCEDURE — 87075 CULTR BACTERIA EXCEPT BLOOD: CPT | Mod: ORL | Performed by: ORTHOPAEDIC SURGERY

## 2023-11-22 PROCEDURE — 87077 CULTURE AEROBIC IDENTIFY: CPT | Mod: ORL | Performed by: ORTHOPAEDIC SURGERY

## 2023-11-23 ENCOUNTER — PREP FOR PROCEDURE (OUTPATIENT)
Dept: OTHER | Facility: CLINIC | Age: 58
End: 2023-11-23
Payer: COMMERCIAL

## 2023-11-23 DIAGNOSIS — M00.9 PYOGENIC ARTHRITIS OF LEFT ELBOW, DUE TO UNSPECIFIED ORGANISM (H): Primary | ICD-10-CM

## 2023-11-24 ENCOUNTER — ANESTHESIA (OUTPATIENT)
Dept: SURGERY | Facility: HOSPITAL | Age: 58
DRG: 863 | End: 2023-11-24
Payer: COMMERCIAL

## 2023-11-24 ENCOUNTER — HOSPITAL ENCOUNTER (INPATIENT)
Facility: HOSPITAL | Age: 58
LOS: 1 days | Discharge: HOME | DRG: 863 | End: 2023-11-27
Attending: ORTHOPAEDIC SURGERY | Admitting: ORTHOPAEDIC SURGERY
Payer: COMMERCIAL

## 2023-11-24 ENCOUNTER — ANESTHESIA EVENT (OUTPATIENT)
Dept: SURGERY | Facility: HOSPITAL | Age: 58
DRG: 863 | End: 2023-11-24
Payer: COMMERCIAL

## 2023-11-24 DIAGNOSIS — M00.9: ICD-10-CM

## 2023-11-24 DIAGNOSIS — M00.9: Primary | ICD-10-CM

## 2023-11-24 DIAGNOSIS — K59.03 DRUG INDUCED CONSTIPATION: ICD-10-CM

## 2023-11-24 LAB
ABO/RH(D): NORMAL
ANTIBODY SCREEN: NEGATIVE
CREAT SERPL-MCNC: 0.78 MG/DL (ref 0.67–1.17)
EGFRCR SERPLBLD CKD-EPI 2021: >90 ML/MIN/1.73M2
ERYTHROCYTE [DISTWIDTH] IN BLOOD BY AUTOMATED COUNT: 12.3 % (ref 10–15)
HCT VFR BLD AUTO: 41.5 % (ref 40–53)
HGB BLD-MCNC: 14 G/DL (ref 13.3–17.7)
HOLD SPECIMEN: NORMAL
MCH RBC QN AUTO: 31.3 PG (ref 26.5–33)
MCHC RBC AUTO-ENTMCNC: 33.7 G/DL (ref 31.5–36.5)
MCV RBC AUTO: 93 FL (ref 78–100)
PLATELET # BLD AUTO: 289 10E3/UL (ref 150–450)
RBC # BLD AUTO: 4.48 10E6/UL (ref 4.4–5.9)
SARS-COV-2 RNA RESP QL NAA+PROBE: NEGATIVE
SPECIMEN EXPIRATION DATE: NORMAL
WBC # BLD AUTO: 9.2 10E3/UL (ref 4–11)

## 2023-11-24 PROCEDURE — 250N000011 HC RX IP 250 OP 636: Performed by: STUDENT IN AN ORGANIZED HEALTH CARE EDUCATION/TRAINING PROGRAM

## 2023-11-24 PROCEDURE — 250N000009 HC RX 250: Performed by: NURSE ANESTHETIST, CERTIFIED REGISTERED

## 2023-11-24 PROCEDURE — 87635 SARS-COV-2 COVID-19 AMP PRB: CPT | Performed by: INTERNAL MEDICINE

## 2023-11-24 PROCEDURE — 87040 BLOOD CULTURE FOR BACTERIA: CPT | Performed by: INTERNAL MEDICINE

## 2023-11-24 PROCEDURE — 250N000013 HC RX MED GY IP 250 OP 250 PS 637: Performed by: ORTHOPAEDIC SURGERY

## 2023-11-24 PROCEDURE — 250N000012 HC RX MED GY IP 250 OP 636 PS 637: Performed by: ORTHOPAEDIC SURGERY

## 2023-11-24 PROCEDURE — 86850 RBC ANTIBODY SCREEN: CPT | Performed by: STUDENT IN AN ORGANIZED HEALTH CARE EDUCATION/TRAINING PROGRAM

## 2023-11-24 PROCEDURE — 258N000003 HC RX IP 258 OP 636: Performed by: ORTHOPAEDIC SURGERY

## 2023-11-24 PROCEDURE — 250N000009 HC RX 250: Performed by: ORTHOPAEDIC SURGERY

## 2023-11-24 PROCEDURE — 370N000017 HC ANESTHESIA TECHNICAL FEE, PER MIN: Performed by: ORTHOPAEDIC SURGERY

## 2023-11-24 PROCEDURE — 272N000001 HC OR GENERAL SUPPLY STERILE: Performed by: ORTHOPAEDIC SURGERY

## 2023-11-24 PROCEDURE — 0HDEXZZ EXTRACTION OF LEFT LOWER ARM SKIN, EXTERNAL APPROACH: ICD-10-PCS | Performed by: ORTHOPAEDIC SURGERY

## 2023-11-24 PROCEDURE — 86901 BLOOD TYPING SEROLOGIC RH(D): CPT | Performed by: STUDENT IN AN ORGANIZED HEALTH CARE EDUCATION/TRAINING PROGRAM

## 2023-11-24 PROCEDURE — 250N000009 HC RX 250: Performed by: ANESTHESIOLOGY

## 2023-11-24 PROCEDURE — 250N000011 HC RX IP 250 OP 636: Mod: JZ | Performed by: NURSE ANESTHETIST, CERTIFIED REGISTERED

## 2023-11-24 PROCEDURE — 99204 OFFICE O/P NEW MOD 45 MIN: CPT | Performed by: INTERNAL MEDICINE

## 2023-11-24 PROCEDURE — 36415 COLL VENOUS BLD VENIPUNCTURE: CPT | Performed by: INTERNAL MEDICINE

## 2023-11-24 PROCEDURE — 999N000141 HC STATISTIC PRE-PROCEDURE NURSING ASSESSMENT: Performed by: ORTHOPAEDIC SURGERY

## 2023-11-24 PROCEDURE — 250N000011 HC RX IP 250 OP 636: Performed by: ANESTHESIOLOGY

## 2023-11-24 PROCEDURE — 36415 COLL VENOUS BLD VENIPUNCTURE: CPT | Performed by: STUDENT IN AN ORGANIZED HEALTH CARE EDUCATION/TRAINING PROGRAM

## 2023-11-24 PROCEDURE — 360N000075 HC SURGERY LEVEL 2, PER MIN: Performed by: ORTHOPAEDIC SURGERY

## 2023-11-24 PROCEDURE — 250N000011 HC RX IP 250 OP 636: Performed by: ORTHOPAEDIC SURGERY

## 2023-11-24 PROCEDURE — 258N000001 HC RX 258: Performed by: ORTHOPAEDIC SURGERY

## 2023-11-24 PROCEDURE — 85027 COMPLETE CBC AUTOMATED: CPT | Performed by: STUDENT IN AN ORGANIZED HEALTH CARE EDUCATION/TRAINING PROGRAM

## 2023-11-24 PROCEDURE — 99233 SBSQ HOSP IP/OBS HIGH 50: CPT | Performed by: EMERGENCY MEDICINE

## 2023-11-24 PROCEDURE — 82565 ASSAY OF CREATININE: CPT | Performed by: ORTHOPAEDIC SURGERY

## 2023-11-24 RX ORDER — BISACODYL 10 MG
10 SUPPOSITORY, RECTAL RECTAL DAILY PRN
Status: DISCONTINUED | OUTPATIENT
Start: 2023-11-24 | End: 2023-11-27 | Stop reason: HOSPADM

## 2023-11-24 RX ORDER — SODIUM CHLORIDE, SODIUM LACTATE, POTASSIUM CHLORIDE, CALCIUM CHLORIDE 600; 310; 30; 20 MG/100ML; MG/100ML; MG/100ML; MG/100ML
INJECTION, SOLUTION INTRAVENOUS CONTINUOUS
Status: DISCONTINUED | OUTPATIENT
Start: 2023-11-24 | End: 2023-11-24 | Stop reason: HOSPADM

## 2023-11-24 RX ORDER — PROPOFOL 10 MG/ML
INJECTION, EMULSION INTRAVENOUS PRN
Status: DISCONTINUED | OUTPATIENT
Start: 2023-11-24 | End: 2023-11-24

## 2023-11-24 RX ORDER — HYDROMORPHONE HYDROCHLORIDE 4 MG/1
4 TABLET ORAL EVERY 4 HOURS PRN
Status: DISCONTINUED | OUTPATIENT
Start: 2023-11-24 | End: 2023-11-27 | Stop reason: HOSPADM

## 2023-11-24 RX ORDER — CEFAZOLIN SODIUM/WATER 2 G/20 ML
2 SYRINGE (ML) INTRAVENOUS SEE ADMIN INSTRUCTIONS
Status: DISCONTINUED | OUTPATIENT
Start: 2023-11-24 | End: 2023-11-24 | Stop reason: HOSPADM

## 2023-11-24 RX ORDER — ONDANSETRON 4 MG/1
4 TABLET, ORALLY DISINTEGRATING ORAL EVERY 30 MIN PRN
Status: DISCONTINUED | OUTPATIENT
Start: 2023-11-24 | End: 2023-11-24 | Stop reason: HOSPADM

## 2023-11-24 RX ORDER — OXYCODONE HYDROCHLORIDE 5 MG/1
10 TABLET ORAL
Status: DISCONTINUED | OUTPATIENT
Start: 2023-11-24 | End: 2023-11-24 | Stop reason: HOSPADM

## 2023-11-24 RX ORDER — NALOXONE HYDROCHLORIDE 1 MG/ML
0.4 INJECTION INTRAMUSCULAR; INTRAVENOUS; SUBCUTANEOUS
Status: DISCONTINUED | OUTPATIENT
Start: 2023-11-24 | End: 2023-11-27 | Stop reason: HOSPADM

## 2023-11-24 RX ORDER — FAMOTIDINE 20 MG/1
20 TABLET, FILM COATED ORAL 2 TIMES DAILY
Status: DISCONTINUED | OUTPATIENT
Start: 2023-11-24 | End: 2023-11-27 | Stop reason: HOSPADM

## 2023-11-24 RX ORDER — ACETAMINOPHEN 500 MG
1500 TABLET ORAL EVERY 6 HOURS PRN
COMMUNITY

## 2023-11-24 RX ORDER — METHYLPREDNISOLONE 4 MG
TABLET, DOSE PACK ORAL SEE ADMIN INSTRUCTIONS
Status: ON HOLD | COMMUNITY
End: 2023-11-27

## 2023-11-24 RX ORDER — SODIUM CHLORIDE, SODIUM LACTATE, POTASSIUM CHLORIDE, CALCIUM CHLORIDE 600; 310; 30; 20 MG/100ML; MG/100ML; MG/100ML; MG/100ML
INJECTION, SOLUTION INTRAVENOUS CONTINUOUS
Status: DISCONTINUED | OUTPATIENT
Start: 2023-11-24 | End: 2023-11-25

## 2023-11-24 RX ORDER — LIDOCAINE 40 MG/G
CREAM TOPICAL
Status: DISCONTINUED | OUTPATIENT
Start: 2023-11-24 | End: 2023-11-27 | Stop reason: HOSPADM

## 2023-11-24 RX ORDER — ACETAMINOPHEN 325 MG/1
650 TABLET ORAL EVERY 4 HOURS PRN
Status: DISCONTINUED | OUTPATIENT
Start: 2023-11-27 | End: 2023-11-27 | Stop reason: HOSPADM

## 2023-11-24 RX ORDER — HYDROMORPHONE HCL IN WATER/PF 6 MG/30 ML
0.4 PATIENT CONTROLLED ANALGESIA SYRINGE INTRAVENOUS
Status: DISCONTINUED | OUTPATIENT
Start: 2023-11-24 | End: 2023-11-27 | Stop reason: HOSPADM

## 2023-11-24 RX ORDER — KETAMINE HYDROCHLORIDE 10 MG/ML
INJECTION INTRAMUSCULAR; INTRAVENOUS PRN
Status: DISCONTINUED | OUTPATIENT
Start: 2023-11-24 | End: 2023-11-24

## 2023-11-24 RX ORDER — MAGNESIUM HYDROXIDE 1200 MG/15ML
LIQUID ORAL PRN
Status: DISCONTINUED | OUTPATIENT
Start: 2023-11-24 | End: 2023-11-24 | Stop reason: HOSPADM

## 2023-11-24 RX ORDER — BUPIVACAINE HYDROCHLORIDE 5 MG/ML
INJECTION, SOLUTION EPIDURAL; INTRACAUDAL
Status: COMPLETED | OUTPATIENT
Start: 2023-11-24 | End: 2023-11-24

## 2023-11-24 RX ORDER — METHYLPREDNISOLONE 4 MG/1
4 TABLET ORAL AT BEDTIME
Qty: 3 TABLET | Refills: 0 | Status: DISCONTINUED | OUTPATIENT
Start: 2023-11-24 | End: 2023-11-24

## 2023-11-24 RX ORDER — NALOXONE HYDROCHLORIDE 1 MG/ML
0.2 INJECTION INTRAMUSCULAR; INTRAVENOUS; SUBCUTANEOUS
Status: DISCONTINUED | OUTPATIENT
Start: 2023-11-24 | End: 2023-11-27 | Stop reason: HOSPADM

## 2023-11-24 RX ORDER — ONDANSETRON 2 MG/ML
INJECTION INTRAMUSCULAR; INTRAVENOUS PRN
Status: DISCONTINUED | OUTPATIENT
Start: 2023-11-24 | End: 2023-11-24

## 2023-11-24 RX ORDER — ACETAMINOPHEN 325 MG/1
975 TABLET ORAL EVERY 8 HOURS
Status: COMPLETED | OUTPATIENT
Start: 2023-11-24 | End: 2023-11-27

## 2023-11-24 RX ORDER — PROPOFOL 10 MG/ML
INJECTION, EMULSION INTRAVENOUS CONTINUOUS PRN
Status: DISCONTINUED | OUTPATIENT
Start: 2023-11-24 | End: 2023-11-24

## 2023-11-24 RX ORDER — CLINDAMYCIN HCL 300 MG
300 CAPSULE ORAL 4 TIMES DAILY
Status: ON HOLD | COMMUNITY
End: 2023-11-27

## 2023-11-24 RX ORDER — ONDANSETRON 2 MG/ML
4 INJECTION INTRAMUSCULAR; INTRAVENOUS EVERY 6 HOURS PRN
Status: DISCONTINUED | OUTPATIENT
Start: 2023-11-24 | End: 2023-11-27 | Stop reason: HOSPADM

## 2023-11-24 RX ORDER — HYDROXYZINE HYDROCHLORIDE 25 MG/1
25 TABLET, FILM COATED ORAL EVERY 6 HOURS PRN
Status: DISCONTINUED | OUTPATIENT
Start: 2023-11-24 | End: 2023-11-27 | Stop reason: HOSPADM

## 2023-11-24 RX ORDER — HYDROMORPHONE HCL IN WATER/PF 6 MG/30 ML
0.2 PATIENT CONTROLLED ANALGESIA SYRINGE INTRAVENOUS
Status: DISCONTINUED | OUTPATIENT
Start: 2023-11-24 | End: 2023-11-27 | Stop reason: HOSPADM

## 2023-11-24 RX ORDER — PROCHLORPERAZINE MALEATE 10 MG
10 TABLET ORAL EVERY 6 HOURS PRN
Status: DISCONTINUED | OUTPATIENT
Start: 2023-11-24 | End: 2023-11-27 | Stop reason: HOSPADM

## 2023-11-24 RX ORDER — VANCOMYCIN HYDROCHLORIDE 1 G/20ML
INJECTION, POWDER, LYOPHILIZED, FOR SOLUTION INTRAVENOUS PRN
Status: DISCONTINUED | OUTPATIENT
Start: 2023-11-24 | End: 2023-11-24 | Stop reason: HOSPADM

## 2023-11-24 RX ORDER — POLYETHYLENE GLYCOL 3350 17 G/17G
17 POWDER, FOR SOLUTION ORAL DAILY
Status: DISCONTINUED | OUTPATIENT
Start: 2023-11-25 | End: 2023-11-27 | Stop reason: HOSPADM

## 2023-11-24 RX ORDER — FENTANYL CITRATE 50 UG/ML
50 INJECTION, SOLUTION INTRAMUSCULAR; INTRAVENOUS EVERY 5 MIN PRN
Status: DISCONTINUED | OUTPATIENT
Start: 2023-11-24 | End: 2023-11-24 | Stop reason: HOSPADM

## 2023-11-24 RX ORDER — ONDANSETRON 4 MG/1
4 TABLET, ORALLY DISINTEGRATING ORAL EVERY 6 HOURS PRN
Status: DISCONTINUED | OUTPATIENT
Start: 2023-11-24 | End: 2023-11-27 | Stop reason: HOSPADM

## 2023-11-24 RX ORDER — LIDOCAINE 40 MG/G
CREAM TOPICAL
Status: DISCONTINUED | OUTPATIENT
Start: 2023-11-24 | End: 2023-11-24 | Stop reason: HOSPADM

## 2023-11-24 RX ORDER — OXYCODONE HYDROCHLORIDE 5 MG/1
5 TABLET ORAL
Status: DISCONTINUED | OUTPATIENT
Start: 2023-11-24 | End: 2023-11-24 | Stop reason: HOSPADM

## 2023-11-24 RX ORDER — METHYLPREDNISOLONE 4 MG/1
4 TABLET ORAL DAILY
Status: ON HOLD | COMMUNITY
End: 2023-11-24

## 2023-11-24 RX ORDER — METHYLPREDNISOLONE 4 MG/1
4 TABLET ORAL
Qty: 4 TABLET | Refills: 0 | Status: DISCONTINUED | OUTPATIENT
Start: 2023-11-25 | End: 2023-11-24

## 2023-11-24 RX ORDER — FENTANYL CITRATE 50 UG/ML
25 INJECTION, SOLUTION INTRAMUSCULAR; INTRAVENOUS EVERY 5 MIN PRN
Status: DISCONTINUED | OUTPATIENT
Start: 2023-11-24 | End: 2023-11-24 | Stop reason: HOSPADM

## 2023-11-24 RX ORDER — METHYLPREDNISOLONE 4 MG/1
4 TABLET ORAL
Qty: 3 TABLET | Refills: 0 | Status: DISCONTINUED | OUTPATIENT
Start: 2023-11-24 | End: 2023-11-24

## 2023-11-24 RX ORDER — CALCIUM CARBONATE 500 MG/1
500 TABLET, CHEWABLE ORAL 4 TIMES DAILY PRN
Status: DISCONTINUED | OUTPATIENT
Start: 2023-11-24 | End: 2023-11-27 | Stop reason: HOSPADM

## 2023-11-24 RX ORDER — ONDANSETRON 2 MG/ML
4 INJECTION INTRAMUSCULAR; INTRAVENOUS EVERY 30 MIN PRN
Status: DISCONTINUED | OUTPATIENT
Start: 2023-11-24 | End: 2023-11-24 | Stop reason: HOSPADM

## 2023-11-24 RX ORDER — AMOXICILLIN 250 MG
1 CAPSULE ORAL 2 TIMES DAILY
Status: DISCONTINUED | OUTPATIENT
Start: 2023-11-24 | End: 2023-11-27 | Stop reason: HOSPADM

## 2023-11-24 RX ORDER — HYDROMORPHONE HYDROCHLORIDE 2 MG/1
2 TABLET ORAL EVERY 4 HOURS PRN
Status: DISCONTINUED | OUTPATIENT
Start: 2023-11-24 | End: 2023-11-27 | Stop reason: HOSPADM

## 2023-11-24 RX ORDER — CEFAZOLIN SODIUM 1 G/3ML
INJECTION, POWDER, FOR SOLUTION INTRAMUSCULAR; INTRAVENOUS
Status: DISCONTINUED
Start: 2023-11-24 | End: 2023-11-24 | Stop reason: HOSPADM

## 2023-11-24 RX ORDER — DEXAMETHASONE SODIUM PHOSPHATE 10 MG/ML
INJECTION, SOLUTION INTRAMUSCULAR; INTRAVENOUS PRN
Status: DISCONTINUED | OUTPATIENT
Start: 2023-11-24 | End: 2023-11-24

## 2023-11-24 RX ORDER — CEFAZOLIN SODIUM/WATER 2 G/20 ML
2 SYRINGE (ML) INTRAVENOUS
Status: COMPLETED | OUTPATIENT
Start: 2023-11-24 | End: 2023-11-24

## 2023-11-24 RX ORDER — FENTANYL CITRATE 50 UG/ML
25-100 INJECTION, SOLUTION INTRAMUSCULAR; INTRAVENOUS
Status: DISCONTINUED | OUTPATIENT
Start: 2023-11-24 | End: 2023-11-24 | Stop reason: HOSPADM

## 2023-11-24 RX ORDER — METHYLPREDNISOLONE 4 MG/1
4 TABLET ORAL
Qty: 2 TABLET | Refills: 0 | Status: DISCONTINUED | OUTPATIENT
Start: 2023-11-24 | End: 2023-11-24

## 2023-11-24 RX ORDER — CEFAZOLIN SODIUM 1 G/50ML
1250 SOLUTION INTRAVENOUS EVERY 12 HOURS
Status: DISCONTINUED | OUTPATIENT
Start: 2023-11-24 | End: 2023-11-25

## 2023-11-24 RX ADMIN — PROPOFOL 200 MCG/KG/MIN: 10 INJECTION, EMULSION INTRAVENOUS at 08:58

## 2023-11-24 RX ADMIN — Medication 2 G: at 08:53

## 2023-11-24 RX ADMIN — DEXAMETHASONE SODIUM PHOSPHATE 10 MG: 10 INJECTION, SOLUTION INTRAMUSCULAR; INTRAVENOUS at 09:15

## 2023-11-24 RX ADMIN — ACETAMINOPHEN 975 MG: 325 TABLET, FILM COATED ORAL at 18:50

## 2023-11-24 RX ADMIN — FENTANYL CITRATE 100 MCG: 50 INJECTION, SOLUTION INTRAMUSCULAR; INTRAVENOUS at 08:01

## 2023-11-24 RX ADMIN — VANCOMYCIN HYDROCHLORIDE 1250 MG: 5 INJECTION, POWDER, LYOPHILIZED, FOR SOLUTION INTRAVENOUS at 20:30

## 2023-11-24 RX ADMIN — BUPIVACAINE HYDROCHLORIDE 30 ML: 5 INJECTION, SOLUTION EPIDURAL; INTRACAUDAL; PERINEURAL at 08:05

## 2023-11-24 RX ADMIN — FAMOTIDINE 20 MG: 20 TABLET ORAL at 20:31

## 2023-11-24 RX ADMIN — VANCOMYCIN HYDROCHLORIDE 1500 MG: 5 INJECTION, POWDER, LYOPHILIZED, FOR SOLUTION INTRAVENOUS at 11:57

## 2023-11-24 RX ADMIN — SENNOSIDES AND DOCUSATE SODIUM 1 TABLET: 8.6; 5 TABLET ORAL at 20:32

## 2023-11-24 RX ADMIN — PROPOFOL 30 MG: 10 INJECTION, EMULSION INTRAVENOUS at 08:59

## 2023-11-24 RX ADMIN — SODIUM CHLORIDE, POTASSIUM CHLORIDE, SODIUM LACTATE AND CALCIUM CHLORIDE: 600; 310; 30; 20 INJECTION, SOLUTION INTRAVENOUS at 11:59

## 2023-11-24 RX ADMIN — ONDANSETRON 4 MG: 2 INJECTION INTRAMUSCULAR; INTRAVENOUS at 09:12

## 2023-11-24 RX ADMIN — METHYLPREDNISOLONE 4 MG: 4 TABLET ORAL at 14:36

## 2023-11-24 RX ADMIN — ACETAMINOPHEN 975 MG: 325 TABLET, FILM COATED ORAL at 12:32

## 2023-11-24 RX ADMIN — KETAMINE HYDROCHLORIDE 20 MG: 10 INJECTION INTRAMUSCULAR; INTRAVENOUS at 09:05

## 2023-11-24 RX ADMIN — MIDAZOLAM 2 MG: 1 INJECTION INTRAMUSCULAR; INTRAVENOUS at 08:01

## 2023-11-24 RX ADMIN — MIDAZOLAM 2 MG: 1 INJECTION INTRAMUSCULAR; INTRAVENOUS at 09:20

## 2023-11-24 RX ADMIN — KETAMINE HYDROCHLORIDE 20 MG: 10 INJECTION INTRAMUSCULAR; INTRAVENOUS at 09:20

## 2023-11-24 RX ADMIN — KETAMINE HYDROCHLORIDE 10 MG: 10 INJECTION INTRAMUSCULAR; INTRAVENOUS at 09:12

## 2023-11-24 RX ADMIN — LIDOCAINE HYDROCHLORIDE 30 MG: 10 INJECTION, SOLUTION EPIDURAL; INFILTRATION; INTRACAUDAL; PERINEURAL at 08:59

## 2023-11-24 ASSESSMENT — ACTIVITIES OF DAILY LIVING (ADL)
ADLS_ACUITY_SCORE: 36
ADLS_ACUITY_SCORE: 36
ADLS_ACUITY_SCORE: 35
ADLS_ACUITY_SCORE: 36
ADLS_ACUITY_SCORE: 33
ADLS_ACUITY_SCORE: 36
ADLS_ACUITY_SCORE: 35
ADLS_ACUITY_SCORE: 36
ADLS_ACUITY_SCORE: 35

## 2023-11-24 NOTE — H&P
I saw and examined the patient this morning.  There are no changes since his preoperative history and physical done within the last 30 days.    He is awake alert cooperative.  He is consented for surgery.    HEENT: AAA  Chest: CTA  ABD: benign  Ext: left arm swelling and drainage from post-operative wound    A/P: Patient is cleared for surgery today for postoperative infection to his left elbow.

## 2023-11-24 NOTE — PHARMACY-VANCOMYCIN DOSING SERVICE
Pharmacy Vancomycin Initial Note  Date of Service 2023  Patient's  1965  58 year old, male    Indication: Bone and Joint Infection    Current estimated CrCl = Estimated Creatinine Clearance: 135.3 mL/min (based on SCr of 0.78 mg/dL).    Creatinine for last 3 days  2023:  3:20 PM Creatinine 0.78 mg/dL    Recent Vancomycin Level(s) for last 3 days  No results found for requested labs within last 3 days.      Vancomycin IV Administrations (past 72 hours)                     vancomycin (VANCOCIN) 1,500 mg in sodium chloride 0.9 % 250 mL intermittent infusion (mg) 1,500 mg New Bag 23 1157                    Nephrotoxins and other renal medications (From now, onward)      None            Contrast Orders - past 72 hours (72h ago, onward)      None            Insight[employer] Prediction of Planned Initial Vancomycin Regimen  Loading dose: 1500 mg IX once at 1157  Regimen: 1250 mg IV every 12 hours.  Start time: 23:57 on 2023  Exposure target: AUC24 (range)400-600 mg/L.hr   AUC24,ss: 502 mg/L.hr  Probability of AUC24 > 400: 73 %  Ctrough,ss: 15.5 mg/L  Probability of Ctrough,ss > 20: 30 %  Probability of nephrotoxicity (Lodise GAURAV ): 11 %        Plan:  Start vancomycin  1250 mg IV q12h. Will start regimen at 2000 to minimize subtherapeutic levels.   Vancomycin monitoring method: AUC  Vancomycin therapeutic monitoring goal: 400-600 mg*h/L  Pharmacy will check vancomycin levels as appropriate in 1-3 Days.    Serum creatinine levels will be ordered daily for the first week of therapy and at least twice weekly for subsequent weeks.      MAYKEL BERUMEN [facility]

## 2023-11-24 NOTE — PHARMACY-ADMISSION MEDICATION HISTORY
Pharmacist Admission Medication History    Admission medication history is complete. The information provided in this note is only as accurate as the sources available at the time of the update.    Information Source(s): Patient, Clinic records, and CareEverywhere/SureScripts via in-person    Pertinent Information: none    Allergies reviewed with patient and updates made in EHR: yes    Medication History Completed By: Diego Rosales RPH 11/24/2023 7:50 AM    PTA Med List   Medication Sig Note Last Dose    acetaminophen (TYLENOL) 500 MG tablet Take 1,500 mg by mouth every 6 hours as needed for mild pain  11/23/2023    clindamycin (CLEOCIN) 300 MG capsule Take 300 mg by mouth 4 times daily 11/24/2023: Filled 11/21/23 x 7 days 11/24/2023 at 0500    methylPREDNISolone (MEDROL DOSEPAK) 4 MG tablet therapy pack Take by mouth See Admin Instructions Follow Package Directions 11/24/2023: Started 11/23/23 11/24/2023    omeprazole (PRILOSEC) 20 MG DR capsule Take 20 mg by mouth daily as needed  11/23/2023

## 2023-11-24 NOTE — PROGRESS NOTES
Patient alert, oriented, pleasant. Taking fluids well, ate lunch, voiding without problem. Denies nausea. Vitals stable. Left arm in sling, fingers warm, denies pain. Block has not worn off, will monitor.

## 2023-11-24 NOTE — OP NOTE
[facility]  Operative Note    Pre-operative diagnosis: Pyogenic arthritis of left elbow, due to unspecified organism (H) [M00.9]   Post-operative diagnosis same   Procedure: Procedure(s):  IRRIGATION AND DEBRIDEMENT, UPPER EXTREMITY - LEFT ELBOW    Surgeon: Madhu Cornelius MD    Assistants(s): Aracely Schaefer PA-C   Anesthesia: MAC with Block    Estimated blood loss: Less than 10 ml    Drains: None   Specimens: * No specimens in log *    Implants: Implant Name Type Inv. Item Serial No.  Lot No. LRB No. Used Action   silicone radial head      Left 1 Explanted       Findings: Infected left elbow joint.   Complications: None.   Condition: Stable       Description of procedure:    Pre-op dx:  Infected left elbow s/p elbow arthroscopy    Post-op dx:  same    Procedure:   Irrigation and debridement with open arthrotomy left elbow  Hardware removal silicone radial head implant    Surgeon:  Madhu Cornelius MD    Assistant:  Aracely Schaefer PA-C    Description of Procedure:  This brought back to the operating room after receiving an upper arm block.  His left arm was prepped and draped in sterile fashion.  A well-padded upper arm tourniquet was applied and elevated to 250 mmHg.    He had a small draining posterior portal that was through the triceps.  This was incised approximately 2 cm.  There is very minimal drainage and no obvious sinus tract.  This was irrigated and lightly debrided.    The decision was made to do an open lateral column procedure.  A longitudinal 5 cm incision was made from the lateral epicondyle up the lateral column of the humerus.  The common extensor origin periosteum was then elevated off the anterior humerus.  The triceps was elevated off the posterior humerus.  Anteriorly the brachialis muscle was then elevated with retractors.  The anterior joint line was then opened.  The area was inspected.  There is no significant fluctuance or large area of  infection.    The triceps was then retracted posteriorly.  There was a small area of purulence approximately 2 to 3 cc that was encountered into the fat pad of the posterior elbow joint.  The joint was then opened.  2 fragments of the previous silicone radial head arthroplasty were identified into the subcutaneous fat.  These fragments measured approximately 8 by 6 x 4 mm x 2.  The joint was then opened up posteriorly and the capsule was incised.  The posterior elbow joint was visualized.  There was also no significant area of fluid or abscess into the area.    3 L of Ancef pulse lavage normal saline was then copiously irrigated through the anterior and posterior elbow joint.  The subcutaneous tissues from the posterior portal were also copiously irrigated and debrided.  No cultures were obtained since good cultures were obtained from the clinic and he received preoperative IV Ancef.    Once the area was clean 1 g of vancomycin powder was then distributed equally in the anterior and posterior elbow joint.  The common extensor origin and triceps periosteum were then closed loosely with 3-0 PDS sutures.  The skin was closed with 3-0 Prolene horizontal mattress sutures.  A large bulky compressive dressing and long-arm splint was applied.  Tourniquet was deflated.  Patient was awakened and transferred to the recovery area without complication.    Plan:  He will be admitted overnight for IV antibiotics.  Will have infectious disease consult about recommendations for oral versus IV antibiotics.  The wound did not have a significant gross infection and would likely be well treated with oral antibiotics.      Madhu Cornelius MD

## 2023-11-24 NOTE — CONSULTS
Consultation - Infectious Disease  [facility]  Scar Easley,  1965, MRN [id number]    Admitting Dx: Pyogenic arthritis of left elbow, due to unspecified organism (H) [M00.9]  Infected elbow (H) [M00.9]    PCP: Jose Cruz Edwards, [phone]       ASSESSMENT   58-year-old man without significant past medical history admitted with left elbow infection.    Left elbow trauma about 30 years ago.  Required radial head implant  Left elbow revision on 2023 with Dr. Cornelius.  Patient reports hardware and bone fragments were removed along with debridement around the patient's ulnar nerve (?)  Surgical site infection.  Patient developed swelling and purulent drainage over the incision site.  Fevers at home.  Underwent aspiration on  with 79,000 white cells (100% neutrophils).  Cultures now with Staph aureus.  Status post elbow debridement on  with hardware removal.  Small amount of pus was noted in the posterior elbow joint.  After debridements vancomycin powder was distributed in the joint.  Patient was on clindamycin prior to admission.    Principal Problem:    Infected elbow (H)       PLAN   -Continue vancomycin  -Follow-up on culture results and susceptibilities  -Anticipate course of IV antibiotics due to penetration into at least the joint with presence of hardware (now removed) raising the possibility of osteomyelitis.  -Discontinue methylprednisone - medrol dose pack prior to admission  -Blood cultures x 2  -COVID screen  -PICC line if blood cultures remain negative    Thank you for this consult. Will follow.    Edy Castro MD  [facility]  Direct messaging: Be Spotted Paging  On-Call ID provider: [phone], option: 9      ===========================================      Chief Complaint   Infected elbow (H)       HPI     We have been requested by Madhu Cornelius MD to evaluate Scar Easley for the above.    History obtained by  patient    Scar Easley is a 58 year old man without significant past medical history who is admitted for left elbow surgery.  He reports a distant history of elbow surgery after trauma with an elbow implant.  Over the years he has had increasing pain and decreased range of motion.  Therefore he followed up with orthopedics and underwent debridement and hardware removal on 11/9.  Unfortunately he began having swelling and drainage from his incision site.  He began feeling ill about a week ago with fevers.  He thought he might have COVID.  He was seen in clinic and had an elbow aspiration which is now growing Staph aureus.  Due to concern for infection he was admitted today for left elbow debridement.          Review of Systems   Ten systems reviewed and negative except for what is noted in the HPI         Medical History  History reviewed. No pertinent past medical history. Surgical History  He  has no past surgical history on file.     Social History  Reviewed, and he  reports that he has never smoked. He has never used smokeless tobacco. He reports current alcohol use. He reports that he does not use drugs.  Social History     Social History Narrative    Not on file     Family History  family history is not on file.  family history reviewed and is not pertinent to the presenting problem.            Allergies   No Known Allergies      Antibiotics   Perioperative cefazolin  Vancomycin 11/24-    Previous:  Clindamycin prior to admission      Physical Exam     Temp:  [97.7  F (36.5  C)-98.6  F (37  C)] 97.8  F (36.6  C)  Pulse:  [56-73] 66  Resp:  [11-20] 15  BP: (115-128)/(67-77) 121/77  SpO2:  [95 %-98 %] 96 %    /77   Pulse 66   Temp 97.8  F (36.6  C) (Oral)   Resp 15   Wt 93.8 kg (206 lb 11.2 oz)   SpO2 96%   BMI 25.84 kg/m      GENERAL:  well-developed, well-nourished, sitting in bed in no acute distress.   HENT:  Head is normocephalic, atraumatic. Oropharynx is moist without exudates or  "ulcers.  EYES:  Eyes have anicteric sclerae without conjunctival injection or stigmata of endocarditis.   NECK:  Supple.  LUNGS:  Clear to auscultation.  CARDIOVASCULAR:  Regular rate and rhythm with no murmurs, gallops or rubs.  ABDOMEN:  Normal bowel sounds, soft, nontender. No appreciable hepatosplenomegaly  EXT: Extremities warm and without edema.  Left elbow and arm wrapped in a sling  SKIN:  No acute rashes. No stigmata of endocarditis.  NEUROLOGIC:  Grossly nonfocal.      Cultures   11/22 synovial fluid culture: Staph aureus    Susceptibility data from last 90 days.  Collected Specimen Info Organism   11/22/23 Synovial fluid from Elbow, Left Staphylococcus aureus       Laboratory results     Recent Labs   Lab 11/24/23  0713   WBC 9.2   HGB 14.0          No results for input(s): \"NA\", \"CO2\", \"BUN\", \"CREATININE\", \"ALBUMIN\", \"BILITOT\", \"ALKPHOS\", \"ALT\", \"AST\", \"GLUCOSE\" in the last 168 hours.    Invalid input(s): \"K\", \"CL\", \"CALCIUM\", \"LABALBU\", \"PROT\"    No results for input(s): \"CRPI\", \"SED\" in the last 168 hours.        Imaging   Radiology results reviewed    POC US Guidance Needle Placement    Result Date: 11/24/2023  Ultrasound was performed as guidance to an anesthesia procedure.  Click \"PACS images\" hyperlink below to view any stored images.  For specific procedure details, view procedure note authored by anesthesia.      Data reviewed today: I reviewed all medications, new labs and imaging results over the last 24 hours. I personally reviewed no images or EKG's today.  The patient's care was discussed with the Patient.    "

## 2023-11-24 NOTE — ANESTHESIA PREPROCEDURE EVALUATION
Anesthesia Pre-Procedure Evaluation    Patient: Scar Easley   MRN: [id number] : 1965        Procedure : Procedure(s):  IRRIGATION AND DEBRIDEMENT, UPPER EXTREMITY - LEFT ELBOW          No past medical history on file.   No past surgical history on file.   No Known Allergies   Social History     Tobacco Use    Smoking status: Never    Smokeless tobacco: Never   Substance Use Topics    Alcohol use: Yes     Comment: occas.      Wt Readings from Last 1 Encounters:   23 93.8 kg (206 lb 11.2 oz)        Anesthesia Evaluation   Pt has had prior anesthetic.         ROS/MED HX  ENT/Pulmonary:  - neg pulmonary ROS     Neurologic:  - neg neurologic ROS     Cardiovascular: Comment: Echo 19:  Interpretation Summary    The right ventricle is mildly dilated.  The right ventricular systolic function is normal.  There is mild (1+) tricuspid regurgitation.  Right ventricular systolic pressure is elevated, consistent with mild to  moderate pulmonary hypertension.  Left ventricular systolic function is normal.  The visual ejection fraction is estimated at 55-60%.  Flattened septum is consistent with RV pressure overload.  There is no comparison study available.         METS/Exercise Tolerance: >4 METS    Hematologic:  - neg hematologic  ROS     Musculoskeletal:  - neg musculoskeletal ROS     GI/Hepatic:  - neg GI/hepatic ROS     Renal/Genitourinary:  - neg Renal ROS     Endo:  - neg endo ROS     Psychiatric/Substance Use:  - neg psychiatric ROS     Infectious Disease: Comment: L elbow infection      Malignancy:  - neg malignancy ROS     Other:  - neg other ROS          Physical Exam    Airway        Mallampati: II   TM distance: > 3 FB   Neck ROM: full   Mouth opening: > 3 cm    Respiratory Devices and Support         Dental  no notable dental history     (+) Minor Abnormalities - some fillings, tiny chips      Cardiovascular   cardiovascular exam normal          Pulmonary   pulmonary exam normal       "          OUTSIDE LABS:  CBC:   Lab Results   Component Value Date    WBC 9.2 11/24/2023    WBC 4.7 12/21/2019    HGB 14.0 11/24/2023    HGB 14.1 12/21/2019    HCT 41.5 11/24/2023    HCT 40.9 12/21/2019     11/24/2023     12/21/2019     BMP:   Lab Results   Component Value Date     12/21/2019     12/19/2019    POTASSIUM 4.3 12/21/2019    POTASSIUM 4.2 12/19/2019    CHLORIDE 106 12/21/2019    CHLORIDE 107 12/19/2019    CO2 28 12/21/2019    CO2 28 12/19/2019    BUN 15 12/21/2019    BUN 13 12/19/2019    CR 1.15 12/21/2019    CR 1.12 12/19/2019    GLC 92 12/21/2019    GLC 83 12/19/2019     COAGS: No results found for: \"PTT\", \"INR\", \"FIBR\"  POC: No results found for: \"BGM\", \"HCG\", \"HCGS\"  HEPATIC:   Lab Results   Component Value Date    ALBUMIN 3.9 12/18/2019    PROTTOTAL 8.3 12/18/2019    ALT 22 12/18/2019    AST 18 12/18/2019    ALKPHOS 81 12/18/2019    BILITOTAL 0.5 12/18/2019     OTHER:   Lab Results   Component Value Date    PATRICIA 9.1 12/21/2019    LIPASE 88 12/18/2019       Anesthesia Plan    ASA Status:  2    NPO Status:  NPO Appropriate    Anesthesia Type: General.   Induction: Intravenous, Propofol.   Maintenance: TIVA.        Consents    Anesthesia Plan(s) and associated risks, benefits, and realistic alternatives discussed. Questions answered and patient/representative(s) expressed understanding.     - Discussed: Risks, Benefits and Alternatives for BOTH SEDATION and the PROCEDURE were discussed     - Discussed with:  Patient       - Patient is DNR/DNI Status: No          Postoperative Care    Pain management: IV analgesics, Oral pain medications, Peripheral nerve block (Single Shot).   PONV prophylaxis: Ondansetron (or other 5HT-3), Dexamethasone or Solumedrol     Comments:    Other Comments: GA Mask  TIVA with Propofol  Zofran for PONV  L supraclavicular block in pre op area. Risks and benefits of the procedure discussed with the patient.             Thong Fernandez MD  "

## 2023-11-24 NOTE — ANESTHESIA PROCEDURE NOTES
Supraclavicular Procedure Note    Pre-Procedure   Staff -        Anesthesiologist:  Thong Fernandez MD       Performed By: anesthesiologist       Location: pre-op       Procedure Start/Stop Times: 11/24/2023 8:05 AM and 11/24/2023 8:08 AM       Pre-Anesthestic Checklist: patient identified, IV checked, site marked, risks and benefits discussed, informed consent, monitors and equipment checked, pre-op evaluation, at physician/surgeon's request and post-op pain management  Timeout:       Correct Patient: Yes        Correct Procedure: Yes        Correct Site: Yes        Correct Position: Yes        Correct Laterality: Yes        Site Marked: Yes  Procedure Documentation  Procedure: Supraclavicular       Laterality: left       Patient Position: supine       Patient Prep/Sterile Barriers: sterile gloves, mask       Skin prep: Chloraprep       Needle Type: short bevel       Needle Gauge: 20.        Needle Length (Inches): 4        Ultrasound guided       1. Ultrasound was used to identify targeted nerve, plexus, vascular marker, or fascial plane and place a needle adjacent to it in real-time.       2. Ultrasound was used to visualize the spread of anesthetic in close proximity to the above referenced structure.       3. A permanent image is entered into the patient's record.       4. The visualized anatomic structures appeared normal.       5. There were no apparent abnormal pathologic findings.    Assessment/Narrative         The placement was negative for: blood aspirated, painful injection and site bleeding       Paresthesias: No.       Bolus given via needle. no blood aspirated via catheter.        Secured via.        Insertion/Infusion Method: Single Shot       Complications: none       Injection made incrementally with aspirations every 5 mL.    Medication(s) Administered   Bupivacaine 0.5% PF (Infiltration) - Infiltration   30 mL - 11/24/2023 8:05:00 AM  Medication Administration Time: 11/24/2023 8:05 AM      FOR  "[facility] ONLY:   Pain Team Contact information: please page the Pain Team Via Bit[employer]. Search \"Pain\". During daytime hours, please page the attending first. At night please page the resident first.      "

## 2023-11-24 NOTE — PROGRESS NOTES
"Daily Progress Note    Assessment/Plan:  58-year-old male admitted following scheduled I&D of the left elbow with removal of silicone radial head implant secondary to pyogenic arthritis of left elbow.  HMS consulted for medical management.    Left elbow infection status post I&D and hardware removal: Surgical cultures pending, ID consulted.  Cultures from aspiration done on 1122 shows Staph aureus.  Currently on IV vancomycin.  History of PE: Not on anticoagulation PTA.  We will start pharmacologic DVT prophylaxis  History of lumbar disc disease    Code status:Full Code        Barriers to Discharge: Joint infection, IV antibiotics    Disposition: Anticipate multiday stay    Subjective:  Scar has no complaints.  His left arm is still numb from local anesthesia.  He denies any fevers or chills or nausea or vomiting.  No chest pain or shortness of breath or leg swelling.        Current Medications Reviewed via EHR List    Objective:  Vital signs in last 24 hours:  [unfilled]  .prog  Weight:   @THISENCWEIGHTS(1)@  Weight change:   Body mass index is 25.84 kg/m .    Intake/Output last 3 shifts:  No intake/output data recorded.  Intake/Output this shift:  I/O this shift:  In: -   Out: 10 [Blood:10]    Physical Exam:  General: No apparent distress  CV: Regular rate and rhythm  Lungs: Clear to auscultation  Abdomen: Soft, nontender        Imaging:  Personally Reviewed.  POC US Guidance Needle Placement    Result Date: 11/24/2023  Ultrasound was performed as guidance to an anesthesia procedure.  Click \"PACS images\" hyperlink below to view any stored images.  For specific procedure details, view procedure note authored by anesthesia.      Lab Results:  Personally Reviewed.   Fingerstick Blood Glucose: @[id number](POCGLUFGR:10)@    Last Hbg A1C: No results found for: \"HGBA1C\"   No results found for: \"INR\", \"PROTIME\"  Recent Results (from the past 24 hour(s))   CBC with platelets    Collection Time: 11/24/23  7:13 AM "   Result Value Ref Range    WBC Count 9.2 4.0 - 11.0 10e3/uL    RBC Count 4.48 4.40 - 5.90 10e6/uL    Hemoglobin 14.0 13.3 - 17.7 g/dL    Hematocrit 41.5 40.0 - 53.0 %    MCV 93 78 - 100 fL    MCH 31.3 26.5 - 33.0 pg    MCHC 33.7 31.5 - 36.5 g/dL    RDW 12.3 10.0 - 15.0 %    Platelet Count 289 150 - 450 10e3/uL   Adult Type and Screen    Collection Time: 11/24/23  7:13 AM   Result Value Ref Range    ABO/RH(D) A POS     Antibody Screen Negative Negative    SPECIMEN EXPIRATION DATE [id number]            Advanced Care Planning    Medical Decision Making       50 MINUTES SPENT BY ME on the date of service doing chart review, history, exam, documentation & further activities per the note.      Kobi Cornelius MD  Date: 11/24/2023  Time: 12:08 PM  [facility]

## 2023-11-24 NOTE — ANESTHESIA CARE TRANSFER NOTE
Patient: Scar Easley    Procedure: Procedure(s):  IRRIGATION AND DEBRIDEMENT, UPPER EXTREMITY - LEFT ELBOW, REMOVAL SILICONE RADIAL HEAD LEFT ELBOW       Diagnosis: Pyogenic arthritis of left elbow, due to unspecified organism (H) [M00.9]  Diagnosis Additional Information: No value filed.    Anesthesia Type:   General     Note:    Oropharynx: oropharynx clear of all foreign objects and spontaneously breathing  Level of Consciousness: awake  Oxygen Supplementation: room air    Independent Airway: airway patency satisfactory and stable  Dentition: dentition unchanged  Vital Signs Stable: post-procedure vital signs reviewed and stable  Report to RN Given: handoff report given  Patient transferred to: Medical/Surgical Unit  Comments: Report called to P4. Patient is alert and awake after procedure.  Handoff Report: Identifed the Patient, Identified the Reponsible Provider, Reviewed the pertinent medical history, Discussed the surgical course, Reviewed Intra-OP anesthesia mangement and issues during anesthesia, Set expectations for post-procedure period and Allowed opportunity for questions and acknowledgement of understanding      Vitals:  Vitals Value Taken Time   /69 11/24/23 1007   Temp 36.5  C (97.7  F) 11/24/23 1007   Pulse 56 11/24/23 1007   Resp 11 11/24/23 1007   SpO2 95 % 11/24/23 1007       Electronically Signed By: HARRIET Landaverde CRNA  November 24, 2023  10:08 AM

## 2023-11-25 LAB
BACTERIA SNV CULT: ABNORMAL
CREAT SERPL-MCNC: 0.92 MG/DL (ref 0.67–1.17)
EGFRCR SERPLBLD CKD-EPI 2021: >90 ML/MIN/1.73M2
HOLD SPECIMEN: NORMAL

## 2023-11-25 PROCEDURE — 250N000013 HC RX MED GY IP 250 OP 250 PS 637: Performed by: ORTHOPAEDIC SURGERY

## 2023-11-25 PROCEDURE — 99221 1ST HOSP IP/OBS SF/LOW 40: CPT | Performed by: INTERNAL MEDICINE

## 2023-11-25 PROCEDURE — 96372 THER/PROPH/DIAG INJ SC/IM: CPT | Performed by: INTERNAL MEDICINE

## 2023-11-25 PROCEDURE — 258N000003 HC RX IP 258 OP 636: Performed by: ORTHOPAEDIC SURGERY

## 2023-11-25 PROCEDURE — 250N000011 HC RX IP 250 OP 636: Mod: JZ | Performed by: INTERNAL MEDICINE

## 2023-11-25 PROCEDURE — 250N000011 HC RX IP 250 OP 636: Performed by: ORTHOPAEDIC SURGERY

## 2023-11-25 PROCEDURE — 99232 SBSQ HOSP IP/OBS MODERATE 35: CPT | Performed by: INTERNAL MEDICINE

## 2023-11-25 PROCEDURE — 36415 COLL VENOUS BLD VENIPUNCTURE: CPT | Performed by: INTERNAL MEDICINE

## 2023-11-25 PROCEDURE — 82565 ASSAY OF CREATININE: CPT | Performed by: INTERNAL MEDICINE

## 2023-11-25 RX ORDER — ENOXAPARIN SODIUM 100 MG/ML
40 INJECTION SUBCUTANEOUS EVERY 24 HOURS
Status: DISCONTINUED | OUTPATIENT
Start: 2023-11-25 | End: 2023-11-27 | Stop reason: HOSPADM

## 2023-11-25 RX ORDER — METOCLOPRAMIDE HYDROCHLORIDE 5 MG/ML
10 INJECTION INTRAMUSCULAR; INTRAVENOUS EVERY 4 HOURS PRN
Status: DISCONTINUED | OUTPATIENT
Start: 2023-11-25 | End: 2023-11-27 | Stop reason: HOSPADM

## 2023-11-25 RX ORDER — CEFAZOLIN SODIUM 2 G/100ML
2 INJECTION, SOLUTION INTRAVENOUS EVERY 8 HOURS
Status: DISCONTINUED | OUTPATIENT
Start: 2023-11-25 | End: 2023-11-27 | Stop reason: HOSPADM

## 2023-11-25 RX ADMIN — ACETAMINOPHEN 975 MG: 325 TABLET, FILM COATED ORAL at 18:51

## 2023-11-25 RX ADMIN — POLYETHYLENE GLYCOL 3350 17 G: 17 POWDER, FOR SOLUTION ORAL at 08:13

## 2023-11-25 RX ADMIN — VANCOMYCIN HYDROCHLORIDE 1250 MG: 5 INJECTION, POWDER, LYOPHILIZED, FOR SOLUTION INTRAVENOUS at 08:13

## 2023-11-25 RX ADMIN — ENOXAPARIN SODIUM 40 MG: 40 INJECTION SUBCUTANEOUS at 17:00

## 2023-11-25 RX ADMIN — HYDROMORPHONE HYDROCHLORIDE 2 MG: 2 TABLET ORAL at 22:04

## 2023-11-25 RX ADMIN — METOCLOPRAMIDE 10 MG: 5 INJECTION, SOLUTION INTRAMUSCULAR; INTRAVENOUS at 10:23

## 2023-11-25 RX ADMIN — HYDROMORPHONE HYDROCHLORIDE 4 MG: 4 TABLET ORAL at 06:08

## 2023-11-25 RX ADMIN — HYDROMORPHONE HYDROCHLORIDE 4 MG: 4 TABLET ORAL at 12:59

## 2023-11-25 RX ADMIN — ACETAMINOPHEN 975 MG: 325 TABLET, FILM COATED ORAL at 12:59

## 2023-11-25 RX ADMIN — HYDROMORPHONE HYDROCHLORIDE 2 MG: 2 TABLET ORAL at 02:03

## 2023-11-25 RX ADMIN — SENNOSIDES AND DOCUSATE SODIUM 1 TABLET: 8.6; 5 TABLET ORAL at 08:13

## 2023-11-25 RX ADMIN — CEFAZOLIN SODIUM 2 G: 2 INJECTION, SOLUTION INTRAVENOUS at 17:00

## 2023-11-25 RX ADMIN — FAMOTIDINE 20 MG: 20 TABLET ORAL at 08:13

## 2023-11-25 RX ADMIN — SENNOSIDES AND DOCUSATE SODIUM 1 TABLET: 8.6; 5 TABLET ORAL at 20:20

## 2023-11-25 RX ADMIN — FAMOTIDINE 20 MG: 20 TABLET ORAL at 20:20

## 2023-11-25 ASSESSMENT — ACTIVITIES OF DAILY LIVING (ADL)
ADLS_ACUITY_SCORE: 36

## 2023-11-25 NOTE — PLAN OF CARE
Problem: Adult Inpatient Plan of Care  Goal: Absence of Hospital-Acquired Illness or Injury  Intervention: Identify and Manage Fall Risk  Recent Flowsheet Documentation  Taken 11/25/2023 0000 by Josee Vaca RN  Safety Promotion/Fall Prevention:   safety round/check completed   clutter free environment maintained  Taken 11/24/2023 2000 by Josee Vaca RN  Safety Promotion/Fall Prevention:   safety round/check completed   clutter free environment maintained  Intervention: Prevent Skin Injury  Recent Flowsheet Documentation  Taken 11/25/2023 0000 by Josee Vaca RN  Body Position: position changed independently  Taken 11/24/2023 2000 by Josee Vaca RN  Body Position: position changed independently   Goal Outcome Evaluation:        Pt is alert and oriented, able to communicate needs. Went for a walk around the unit. Old drainage noted on surgical dressing, PRN dilaudid given x2. Left arm still has some numbness per pt.

## 2023-11-25 NOTE — PROGRESS NOTES
[facility]    Infectious Disease Progress Note     11/25/2023     Assessment & Plan   Scar Easley is a 58 year old male who was admitted on 11/24/2023.     ASSESSMENT:  Left elbow infection  S/P L elbow revision 11/9/2023, hardware and bone fragments removed  Surgical site infection, purulent drainage, fevers, septic arthritis status post elbow debridement with a hardware removal on 11/24/2023  MSSA    Susceptibility data from last 90 days.  Collected Specimen Info Organism Clindamycin Daptomycin Doxycycline Erythromycin Gentamicin Oxacillin Tetracycline Trimethoprim/Sulfamethoxazole  Vancomycin   11/22/23 Synovial fluid from Elbow, Left Staphylococcus aureus  S  S  S  S  S  S  S  S  S          RECOMMENDATIONS:    Antibiotics: IV cefazolin  Recommend course of IV antibiotic due to penetration into at least the joint with presence of hardware, not removed, raising possibility of osteomyelitis  Follow blood cultures  Monitor CBC, CMP  PICC line if blood cultures remain negative for 48 hours  Appreciate input from hospitalist  Patient new to me.  Discussed with patient and questions answered      Doreen Amato MD  [facility]  [phone]      Interval History   Chart reviewed  Patient seen and updated  Pain controlled      Physical Exam   Temp: 98  F (36.7  C) Temp src: Oral BP: 115/68 Pulse: 66   Resp: 18 SpO2: 93 % O2 Device: None (Room air)    Vitals:    11/24/23 0704   Weight: 93.8 kg (206 lb 11.2 oz)     Vital Signs with Ranges  Temp:  [97.8  F (36.6  C)-98  F (36.7  C)] 98  F (36.7  C)  Pulse:  [57-66] 66  Resp:  [14-18] 18  BP: (101-119)/(50-75) 115/68  SpO2:  [93 %-96 %] 93 %    Constitutional: Awake, no apparent distress  Lungs: normal breathing pattern, no crackles or wheezing  Cardiovascular: Regular rate and rhythm, S1 S2  Abdomen: non distended  Skin: dressing left elbow  Neuro: deconditioned  Psych: able to answer questions    Medications       "acetaminophen  975 mg Oral Q8H    ceFAZolin  2 g Intravenous Q8H    enoxaparin ANTICOAGULANT  40 mg Subcutaneous Q24H    famotidine  20 mg Oral BID    Or    famotidine  20 mg Intravenous BID    polyethylene glycol  17 g Oral Daily    senna-docusate  1 tablet Oral BID    sodium chloride (PF)  3 mL Intracatheter Q8H       Data   All microbiology laboratory data reviewed.  Recent Labs   Lab Test 11/24/23  0713 12/21/19  1010 12/18/19  1736   WBC 9.2 4.7 6.8   HGB 14.0 14.1 13.9   HCT 41.5 40.9 39.9*   MCV 93 91 92    202 152     Recent Labs   Lab Test 11/25/23  0604 11/24/23  1520 12/21/19  1010   CR 0.92 0.78 1.15     No lab results found.  No lab results found.    Invalid input(s): \"UC\"    MICROBIOLOGY:    Reviewed    7-Day Micro Results       Collected Updated Procedure Result Status      11/24/2023 1520 11/25/2023 0701 Blood Culture Arm, Right [[id number]]    Blood from Arm, Right    Preliminary result Component Value   Culture No growth after 12 hours  [P]                11/24/2023 1404 11/25/2023 0701 Blood Culture Arm, Right [[id number]]   Blood from Arm, Right    Preliminary result Component Value   Culture No growth after 12 hours  [P]                11/24/2023 1353 11/24/2023 1443 Asymptomatic COVID-19 Virus (Coronavirus) by PCR Nose [[id number]]    Swab from Nose    Final result Component Value   SARS CoV2 PCR Negative   NEGATIVE: SARS-CoV-2 (COVID-19) RNA not detected, presumed negative.            11/22/2023 1515 11/25/2023 0648 Synovial fluid Aerobic Bacterial Culture Routine [[id number]]    (Abnormal)   Synovial fluid from Elbow, Left    Final result Component Value   Culture 3+ Staphylococcus aureus        Susceptibility        Staphylococcus aureus      MATEO      Ciprofloxacin <=0.5 ug/mL Susceptible  [*]       Clindamycin 0.25 ug/mL Susceptible      Daptomycin 0.25 ug/mL Susceptible      Doxycycline <=0.5 ug/mL Susceptible      Erythromycin <=0.25 ug/mL Susceptible      Gentamicin <=0.5 " ug/mL Susceptible      Inducible macrolide resistance test Negative ug/mL Negative  [*]       Levofloxacin 0.25 ug/mL Susceptible  [*]       Linezolid 2 ug/mL Susceptible  [*]       Moxifloxacin <=0.25 ug/mL Susceptible  [*]       Nitrofurantoin <=16 ug/mL Susceptible  [*]       Oxacillin <=0.25 ug/mL Susceptible  [1]       Rifampin <=0.5 ug/mL Susceptible  [*]       Tetracycline <=1 ug/mL Susceptible      Tigecycline <=0.12 ug/mL Susceptible  [*]       Trimethoprim/Sulfamethoxazole <=0.5/9.5 ug/mL Susceptible      Vancomycin 1 ug/mL Susceptible                   [*]  Suppressed Antibiotic     [1]  Oxacillin susceptible isolates are susceptible to cephalosporins (example: cefazolin and cephalexin) and beta lactam combination agents. Oxacillin resistant isolates are resistant to these agents.                   11/22/2023 1515 11/22/2023 2209 Gram Stain [[id number]]   (Abnormal)   Synovial fluid from Elbow, Left    Final result Component Value   Gram Stain Result 2+ Gram positive cocci   Gram Stain Result 4+ WBC seen   Predominantly PMNs            11/22/2023 1515 11/24/2023 2146 Anaerobic Bacterial Culture Routine [[id number]]    Synovial fluid from Elbow, Left    Preliminary result Component Value   Culture No anaerobic organisms isolated after 2 days  [P]                11/22/2023 1515 11/22/2023 2216 Cell count with differential fluid [[id number]]    (Abnormal)   Synovial fluid from Other    Final result Component Value   No component results            11/22/2023 1515 11/22/2023 2214 Cell Count Body Fluid [[id number]]    (Abnormal)   Synovial fluid from Other    Final result Component Value Units   Color Yellow    Clarity Turbid    Cell Count Fluid Source Joint, Other    fluid, elbow  fluid, elbow   Total Nucleated Cells 79,360 /uL            11/22/2023 1515 11/22/2023 2216 Differential Body Fluid [[id number]]    Synovial fluid from Other    Final result Component Value Units   % Neutrophils 100 %   %  "Lymphocytes --    % Monocyte/Macrophages --                      RADIOLOGY:    Reviewed  POC US Guidance Needle Placement    Result Date: 11/24/2023  Ultrasound was performed as guidance to an anesthesia procedure.  Click \"PACS images\" hyperlink below to view any stored images.  For specific procedure details, view procedure note authored by anesthesia.           "

## 2023-11-25 NOTE — PROGRESS NOTES
"[facility]    Medicine Progress Note - Hospitalist Service    Date of Admission:  11/24/2023    Assessment & Plan   58-year-old male admitted following scheduled I&D of the left elbow with removal of silicone radial head implant secondary to pyogenic arthritis of left elbow.  [facility] [place] [facility] consulted for medical management.     Septic joint left elbow infection status post I&D and hardware removal: Surgical cultures pending, ID consulted.  Cultures from aspiration done on 1122 shows Staph aureus.  Currently on IV vancomycin.  Splint as per orthopedics.  Defer PICC line to infectious disease.  History of PE: Not on anticoagulation PTA.  Ordered Lovenox   history of lumbar disc disease  Hiccups likely due to gastric irritation--ordered IV Reglan as needed continue Pepcid     Code status:Full Code          Diet: Advance Diet as Tolerated: Regular Diet Adult  Discharge Instruction - Regular Diet Adult    DVT Prophylaxis: Enoxaparin (Lovenox) SQ  Ruffin Catheter: Not present  Lines: None     Cardiac Monitoring: None  Code Status: Full Code      Clinically Significant Risk Factors Present on Admission                       # Overweight: Estimated body mass index is 25.84 kg/m  as calculated from the following:    Height as of this encounter: 1.905 m (6' 3\").    Weight as of this encounter: 93.8 kg (206 lb 11.2 oz).              Disposition Plan      Expected Discharge Date: 11/28/2023                    Eliseo Collins MD  Hospitalist Service  [facility]  Securely message with [email or web address] (more info)  Text page via [employer] Paging/Directory   ______________________________________________________________________    Interval History   Patient new to me.  Chart reviewed.  Patient has hiccups.  Left elbow pain is controlled.  Awaiting cultures to finalize antibiotics.  No fever overnight.    Physical Exam   Vital Signs: Temp: 98  F (36.7  C) Temp src: Oral BP: 115/68 Pulse: 66   Resp: 18 SpO2: 93 " % O2 Device: None (Room air)    Weight: 206 lbs 11.2 oz    Patient having hiccups  Left elbow in splint      Medical Decision Making       25 MINUTES SPENT BY ME on the date of service doing chart review, history, exam, documentation & further activities per the note.  MANAGEMENT DISCUSSED with the following over the past 24 hours: Patient   NOTE(S)/MEDICAL RECORDS REVIEWED over the past 24 hours: ID notes       Data     I have personally reviewed the following data over the past 24 hrs:    N/A  \   N/A   / N/A     N/A N/A N/A /  N/A   N/A N/A 0.92 \       Imaging results reviewed over the past 24 hrs:   No results found for this or any previous visit (from the past 24 hour(s)).

## 2023-11-25 NOTE — PROGRESS NOTES
[facility] Orthopedic Post-Op Note         Assessment and Plan:    Assessment:   POD #1 LEFT ELBOW I&D.  Date of surgery 11/24/2023.    Cultures from 11/22/23 aspiration positive for Staph aureus, pan-sensitive.  Currently IV Vanco    Doing well.  Normal healing wound. No active bleeding or drainage.  No immediate surgical complications identified.  Pain well-controlled.       Plan:   - Dressing changed today due to post-op drainage from yesterday. New bulky dressing with long arm splint in place. Cover to keep dry if pt wants to shower.  - Keep splint intact until Monday outpatient orthopedic clinic appointment.  - MELVIN ALLEN.  - Continue IV abx per ID. Appreciate their recommendation today about need for PICC line versus discharge with oral antibiotics.  - Continue DVT prophylaxis per medicine.  - Pt to follow-up with Dr. Cornelius in clinic on Monday 12/27/23 @ 12:30 PM [facility].  Pt aware of this appointment.           Interval History:     Doing well.  Continues to improve.  Pain is well-controlled.  No fevers.                Significant Problems:   None          Review of Systems:   - Denies significant pain, shortness or breath, calf pain, nausea, fevers.          Medications:   All medications related to the patient's surgery have been reviewed  Current Facility-Administered Medications   Medication    [START ON 11/27/2023] acetaminophen (TYLENOL) tablet 650 mg    acetaminophen (TYLENOL) tablet 975 mg    benzocaine-menthol (CHLORASEPTIC) 6-10 MG lozenge 1 lozenge    bisacodyl (DULCOLAX) suppository 10 mg    calcium carbonate (TUMS) chewable tablet 500 mg    famotidine (PEPCID) tablet 20 mg    Or    famotidine (PEPCID) injection 20 mg    HYDROmorphone (DILAUDID) injection 0.2 mg    Or    HYDROmorphone (DILAUDID) injection 0.4 mg    HYDROmorphone (DILAUDID) tablet 2 mg    Or    HYDROmorphone (DILAUDID) tablet 4 mg    hydrOXYzine (ATARAX) tablet 25 mg    lactated ringers  infusion    lidocaine (LMX4) cream    lidocaine 1 % 0.1-1 mL    magnesium hydroxide (MILK OF MAGNESIA) suspension 30 mL    naloxone (NARCAN) injection 0.2 mg    Or    naloxone (NARCAN) injection 0.4 mg    Or    naloxone (NARCAN) injection 0.2 mg    Or    naloxone (NARCAN) injection 0.4 mg    ondansetron (ZOFRAN ODT) ODT tab 4 mg    Or    ondansetron (ZOFRAN) injection 4 mg    polyethylene glycol (MIRALAX) Packet 17 g    prochlorperazine (COMPAZINE) injection 10 mg    Or    prochlorperazine (COMPAZINE) tablet 10 mg    senna-docusate (SENOKOT-S/PERICOLACE) 8.6-50 MG per tablet 1 tablet    sodium chloride (PF) 0.9% PF flush 3 mL    sodium chloride (PF) 0.9% PF flush 3 mL    vancomycin (VANCOCIN) 1,250 mg in sodium chloride 0.9 % 250 mL intermittent infusion             Physical Exam:   All vitals stable  Temp: 98  F (36.7  C) Temp src: Oral BP: 115/68 Pulse: 66   Resp: 18 SpO2: 93 % O2 Device: None (Room air)    Wound clean and dry with minimal or no drainage.  Surrounding skin with minimal erythema.  Left elbow: Dried bloody serous drainage on ACE wrap.  Lateral elbow incision intact with prolene sutures and without erythema, warmth, fluctuance or drainage.  Surrounding skin with minimal erythema.  Left hand and finger ROM intact.  NVI.  Brisk cap refill left hand.           Data:   All laboratory data related to this surgery reviewed  All imaging studies related to this surgery reviewed     Fior Arreguin PA-C  [facility] - Hand [facility]  [phone]  Personal cell: [phone]

## 2023-11-25 NOTE — PROGRESS NOTES
Patient alert, oriented. Wife and daughters here visiting. Up to bathroom, steady on feet. Needs assist with food containers, etc.. since his left arm is wrapped and he had a block. Small amount pink drainage on surgical dressing, will monitor. Fingers warm.

## 2023-11-25 NOTE — PROGRESS NOTES
Patient ambulating in orantes and room independently, pain appears to be well-controlled. Seen by ortho, arm re-wrapped, no new drainage and wound looks clean. Fingers warm and patient able to wiggle them. Taking fluids well, eating well. Will monitor.

## 2023-11-26 ENCOUNTER — HEALTH MAINTENANCE LETTER (OUTPATIENT)
Age: 58
End: 2023-11-26

## 2023-11-26 PROBLEM — M00.9: Status: ACTIVE | Noted: 2023-11-26

## 2023-11-26 LAB
CREAT SERPL-MCNC: 0.94 MG/DL (ref 0.67–1.17)
EGFRCR SERPLBLD CKD-EPI 2021: >90 ML/MIN/1.73M2

## 2023-11-26 PROCEDURE — 250N000011 HC RX IP 250 OP 636: Mod: JZ | Performed by: INTERNAL MEDICINE

## 2023-11-26 PROCEDURE — 99232 SBSQ HOSP IP/OBS MODERATE 35: CPT | Performed by: INTERNAL MEDICINE

## 2023-11-26 PROCEDURE — 120N000001 HC R&B MED SURG/OB

## 2023-11-26 PROCEDURE — 36415 COLL VENOUS BLD VENIPUNCTURE: CPT | Performed by: INTERNAL MEDICINE

## 2023-11-26 PROCEDURE — 82565 ASSAY OF CREATININE: CPT | Performed by: INTERNAL MEDICINE

## 2023-11-26 PROCEDURE — 99231 SBSQ HOSP IP/OBS SF/LOW 25: CPT | Performed by: INTERNAL MEDICINE

## 2023-11-26 PROCEDURE — 250N000013 HC RX MED GY IP 250 OP 250 PS 637: Performed by: ORTHOPAEDIC SURGERY

## 2023-11-26 RX ORDER — LIDOCAINE 40 MG/G
CREAM TOPICAL
Status: DISCONTINUED | OUTPATIENT
Start: 2023-11-27 | End: 2023-11-26

## 2023-11-26 RX ORDER — LIDOCAINE 40 MG/G
CREAM TOPICAL
Status: DISCONTINUED | OUTPATIENT
Start: 2023-11-26 | End: 2023-11-27 | Stop reason: HOSPADM

## 2023-11-26 RX ADMIN — FAMOTIDINE 20 MG: 20 TABLET ORAL at 08:45

## 2023-11-26 RX ADMIN — ENOXAPARIN SODIUM 40 MG: 40 INJECTION SUBCUTANEOUS at 16:50

## 2023-11-26 RX ADMIN — CEFAZOLIN SODIUM 2 G: 2 INJECTION, SOLUTION INTRAVENOUS at 00:09

## 2023-11-26 RX ADMIN — CEFAZOLIN SODIUM 2 G: 2 INJECTION, SOLUTION INTRAVENOUS at 09:27

## 2023-11-26 RX ADMIN — FAMOTIDINE 20 MG: 20 TABLET ORAL at 20:07

## 2023-11-26 RX ADMIN — ANTACID TABLETS 500 MG: 500 TABLET, CHEWABLE ORAL at 17:47

## 2023-11-26 RX ADMIN — CEFAZOLIN SODIUM 2 G: 2 INJECTION, SOLUTION INTRAVENOUS at 16:06

## 2023-11-26 RX ADMIN — ACETAMINOPHEN 975 MG: 325 TABLET, FILM COATED ORAL at 03:26

## 2023-11-26 RX ADMIN — ACETAMINOPHEN 975 MG: 325 TABLET, FILM COATED ORAL at 12:30

## 2023-11-26 RX ADMIN — SENNOSIDES AND DOCUSATE SODIUM 1 TABLET: 8.6; 5 TABLET ORAL at 08:45

## 2023-11-26 RX ADMIN — ACETAMINOPHEN 975 MG: 325 TABLET, FILM COATED ORAL at 18:58

## 2023-11-26 RX ADMIN — SENNOSIDES AND DOCUSATE SODIUM 1 TABLET: 8.6; 5 TABLET ORAL at 20:07

## 2023-11-26 RX ADMIN — HYDROMORPHONE HYDROCHLORIDE 2 MG: 2 TABLET ORAL at 08:45

## 2023-11-26 ASSESSMENT — ACTIVITIES OF DAILY LIVING (ADL)
ADLS_ACUITY_SCORE: 36

## 2023-11-26 NOTE — PLAN OF CARE
Goal Outcome Evaluation:    A&Ox4, up independently. PIV replaced due to infiltration.   VSS, reporting only mild pain. PRN for pain given x1   Acewrap and dressing in place, CDI. Fingers and hand with some edema. Sensation present, pt denying any numbness or tingling. CMS intact.       Problem: Adult Inpatient Plan of Care  Goal: Plan of Care Review    Outcome: Progressing  Goal: Absence of Hospital-Acquired Illness or Injury  Outcome: Progressing  Intervention: Identify and Manage Fall Risk  Recent Flowsheet Documentation  Taken 11/25/2023 2330 by Katia Woo, RN  Safety Promotion/Fall Prevention:   safety round/check completed   clutter free environment maintained  Taken 11/25/2023 2000 by Katia Woo RN  Safety Promotion/Fall Prevention:   safety round/check completed   clutter free environment maintained  Intervention: Prevent Skin Injury  Recent Flowsheet Documentation  Taken 11/25/2023 2330 by Katia Woo, RN  Body Position: position changed independently  Taken 11/25/2023 2000 by Katia Woo RN  Body Position: position changed independently  Goal: Optimal Comfort and Wellbeing  Outcome: Progressing  Goal: Readiness for Transition of Care  Outcome: Progressing     11/26/23

## 2023-11-26 NOTE — UTILIZATION REVIEW
Inpatient appropriate    Admission Status; Secondary Review Determination       Under the authority of the Utilization Management Committee, the utilization review process indicated a secondary review on the above patient. The review outcome is based on review of the medical records, discussions with staff, and applying clinical experience noted on the date of the review.     (x) Inpatient Status Appropriate - This patient's medical care is consistent with medical management for inpatient care and reasonable inpatient medical practice.     RATIONALE FOR DETERMINATION   58 years old man with history of left elbow revision on 11/9/2023.  Patient admitted after developed swelling and purulent drainage at the incision site.  Underwent aspiration on 11/22.  Culture with Staph aureus.  On IV antibiotic for septic arthritis with hardware removal.    At the time of admission with the information available to the attending physician more than 2 nights Hospital complex care was anticipated, based on patient risk of adverse outcome if treated as outpatient and complex care required. Inpatient admission is appropriate based on the Medicare guidelines.     The information on this document is developed by the utilization review team in order for the business office to ensure compliance. This only denotes the appropriateness of proper admission status and does not reflect the quality of care rendered.   The definitions of Inpatient Status and Observation Status used in making the determination above are those provided in the CMS Coverage Manual, Chapter 1 and Chapter 6, section 70.4.   Sincerely,   Dwayne Mora MD  Utilization Review  Physician Advisor  [facility].

## 2023-11-26 NOTE — PROGRESS NOTES
Patient states that he feels better today, was able to get some sleep last night. Eating and taking fluids well. Up independently in room. Medicated for pain x1 this shift (thus far) with good relief. Left arm dressing and wrap is intact, no drainage noted. CMS good to fingers. Patient is resting comfortably.

## 2023-11-26 NOTE — PROGRESS NOTES
"[facility]    Medicine Progress Note - Hospitalist Service    Date of Admission:  11/24/2023    Assessment & Plan   58-year-old male admitted following scheduled I&D of the left elbow with removal of silicone radial head implant secondary to pyogenic arthritis of left elbow.  [facility] consulted for medical management.     Septic joint left elbow infection status post I&D and hardware removal: Surgical cultures pending, ID consulted.  Cultures from aspiration done on 1122 shows Staph aureus.  Currently on IV Cefazolin.  Splint as per orthopedics.  Ordered PICC for IV abx  History of PE: Not on anticoagulation PTA.  continue Lovenox   history of lumbar disc disease  Hiccups likely due to gastric irritation--resolved with pepcid and IV reglan.     Code status:Full Code          Diet: Advance Diet as Tolerated: Regular Diet Adult  Discharge Instruction - Regular Diet Adult    DVT Prophylaxis: Enoxaparin (Lovenox) SQ  Ruffin Catheter: Not present  Lines: None     Cardiac Monitoring: None  Code Status: Full Code      Clinically Significant Risk Factors Present on Admission                       # Overweight: Estimated body mass index is 25.84 kg/m  as calculated from the following:    Height as of this encounter: 1.905 m (6' 3\").    Weight as of this encounter: 93.8 kg (206 lb 11.2 oz).              Disposition Plan     Expected Discharge Date: 11/28/2023                    Eliseo Collins MD  Hospitalist Service  [facility]  Securely message with [employer] (more info)  Text page via [employer] Paging/Directory   ______________________________________________________________________    Interval History   Chart reviewed.  No hiccups today. Ordered PICC line  No fever overnight.    Physical Exam   Vital Signs: Temp: 98.6  F (37  C) Temp src: Oral BP: 109/61 Pulse: 71   Resp: 18 SpO2: 95 % O2 Device: None (Room air)    Weight: 206 lbs 11.2 oz    Patient having hiccups  Left elbow in " splint      Medical Decision Making       25 MINUTES SPENT BY ME on the date of service doing chart review, history, exam, documentation & further activities per the note.  MANAGEMENT DISCUSSED with the following over the past 24 hours: Patient   NOTE(S)/MEDICAL RECORDS REVIEWED over the past 24 hours: ID notes       Data     I have personally reviewed the following data over the past 24 hrs:    N/A  \   N/A   / N/A     N/A N/A N/A /  N/A   N/A N/A 0.94 \       Imaging results reviewed over the past 24 hrs:   No results found for this or any previous visit (from the past 24 hour(s)).

## 2023-11-26 NOTE — PROGRESS NOTES
[facility]    Infectious Disease Progress Note     11/26/2023     Assessment & Plan   Scar Easley is a 58 year old male who was admitted on 11/24/2023.     ASSESSMENT:  Left elbow infection  S/P L elbow revision 11/9/2023, hardware and bone fragments removed  Surgical site infection, purulent drainage, fevers, septic arthritis status post elbow debridement with a hardware removal on 11/24/2023  MSSA    Susceptibility data from last 90 days.  Collected Specimen Info Organism Clindamycin Daptomycin Doxycycline Erythromycin Gentamicin Oxacillin Tetracycline Trimethoprim/Sulfamethoxazole  Vancomycin   11/22/23 Synovial fluid from Elbow, Left Staphylococcus aureus  S  S  S  S  S  S  S  S  S          RECOMMENDATIONS:    Antibiotics: IV cefazolin  Recommend course of IV antibiotic due to penetration into at least the joint with presence of hardware, not removed, raising possibility of osteomyelitis  Follow blood cultures  Monitor CBC, CMP  PICC line if blood cultures remain negative for 48 hours  Appreciate input from hospitalist  Patient new to me.  Discussed with patient and questions answered  Dr. Castro will be following starting 11/27/2023      Doreen Amato MD  [facility]  [phone]      Interval History   Chart reviewed  Patient seen and updated  Questions answered  Pain controlled      Physical Exam   Temp: 98.6  F (37  C) Temp src: Oral BP: 109/61 Pulse: 71   Resp: 18 SpO2: 95 % O2 Device: None (Room air)    Vitals:    11/24/23 0704   Weight: 93.8 kg (206 lb 11.2 oz)     Vital Signs with Ranges  Temp:  [97.8  F (36.6  C)-98.6  F (37  C)] 98.6  F (37  C)  Pulse:  [64-74] 71  Resp:  [18-20] 18  BP: (104-109)/(56-61) 109/61  SpO2:  [95 %-97 %] 95 %    Constitutional: Awake, no apparent distress  Lungs: normal breathing pattern, no crackles or wheezing  Cardiovascular: Regular rate and rhythm, S1 S2  Abdomen: non distended  Skin: dressing left  "elbow  Neuro: deconditioned  Psych: able to answer questions    Medications      acetaminophen  975 mg Oral Q8H    ceFAZolin  2 g Intravenous Q8H    enoxaparin ANTICOAGULANT  40 mg Subcutaneous Q24H    famotidine  20 mg Oral BID    Or    famotidine  20 mg Intravenous BID    polyethylene glycol  17 g Oral Daily    senna-docusate  1 tablet Oral BID    sodium chloride (PF)  3 mL Intracatheter Q8H       Data   All microbiology laboratory data reviewed.  Recent Labs   Lab Test 11/24/23  0713 12/21/19  1010 12/18/19  1736   WBC 9.2 4.7 6.8   HGB 14.0 14.1 13.9   HCT 41.5 40.9 39.9*   MCV 93 91 92    202 152     Recent Labs   Lab Test 11/26/23  0545 11/25/23  0604 11/24/23  1520   CR 0.94 0.92 0.78     No lab results found.  No lab results found.    Invalid input(s): \"UC\"    MICROBIOLOGY:    Reviewed    7-Day Micro Results       Collected Updated Procedure Result Status      11/24/2023 1520 11/25/2023 1901 Blood Culture Arm, Right [[id number]]    Blood from Arm, Right    Preliminary result Component Value   Culture No growth after 1 day  [P]                11/24/2023 1404 11/25/2023 1901 Blood Culture Arm, Right [[id number]]   Blood from Arm, Right    Preliminary result Component Value   Culture No growth after 1 day  [P]                11/24/2023 1353 11/24/2023 1443 Asymptomatic COVID-19 Virus (Coronavirus) by PCR Nose [[id number]]    Swab from Nose    Final result Component Value   SARS CoV2 PCR Negative   NEGATIVE: SARS-CoV-2 (COVID-19) RNA not detected, presumed negative.            11/22/2023 1515 11/25/2023 0648 Synovial fluid Aerobic Bacterial Culture Routine [[id number]]    (Abnormal)   Synovial fluid from Elbow, Left    Final result Component Value   Culture 3+ Staphylococcus aureus        Susceptibility        Staphylococcus aureus      MATEO      Ciprofloxacin <=0.5 ug/mL Susceptible  [*]       Clindamycin 0.25 ug/mL Susceptible      Daptomycin 0.25 ug/mL Susceptible      Doxycycline <=0.5 ug/mL " Susceptible      Erythromycin <=0.25 ug/mL Susceptible      Gentamicin <=0.5 ug/mL Susceptible      Inducible macrolide resistance test Negative ug/mL Negative  [*]       Levofloxacin 0.25 ug/mL Susceptible  [*]       Linezolid 2 ug/mL Susceptible  [*]       Moxifloxacin <=0.25 ug/mL Susceptible  [*]       Nitrofurantoin <=16 ug/mL Susceptible  [*]       Oxacillin <=0.25 ug/mL Susceptible  [1]       Rifampin <=0.5 ug/mL Susceptible  [*]       Tetracycline <=1 ug/mL Susceptible      Tigecycline <=0.12 ug/mL Susceptible  [*]       Trimethoprim/Sulfamethoxazole <=0.5/9.5 ug/mL Susceptible      Vancomycin 1 ug/mL Susceptible                   [*]  Suppressed Antibiotic     [1]  Oxacillin susceptible isolates are susceptible to cephalosporins (example: cefazolin and cephalexin) and beta lactam combination agents. Oxacillin resistant isolates are resistant to these agents.                   11/22/2023 1515 11/22/2023 2209 Gram Stain [[id number]]   (Abnormal)   Synovial fluid from Elbow, Left    Final result Component Value   Gram Stain Result 2+ Gram positive cocci   Gram Stain Result 4+ WBC seen   Predominantly PMNs            11/22/2023 1515 11/25/2023 2146 Anaerobic Bacterial Culture Routine [[id number]]    Synovial fluid from Elbow, Left    Preliminary result Component Value   Culture No anaerobic organisms isolated after 3 days  [P]                11/22/2023 1515 11/22/2023 2216 Cell count with differential fluid [[id number]]    (Abnormal)   Synovial fluid from Other    Final result Component Value   No component results            11/22/2023 1515 11/22/2023 2214 Cell Count Body Fluid [[id number]]    (Abnormal)   Synovial fluid from Other    Final result Component Value Units   Color Yellow    Clarity Turbid    Cell Count Fluid Source Joint, Other    fluid, elbow  fluid, elbow   Total Nucleated Cells 79,360 /uL            11/22/2023 1515 11/22/2023 2216 Differential Body Fluid [[id number]]    Synovial fluid  "from Other    Final result Component Value Units   % Neutrophils 100 %   % Lymphocytes --    % Monocyte/Macrophages --                      RADIOLOGY:    Reviewed  POC US Guidance Needle Placement    Result Date: 11/24/2023  Ultrasound was performed as guidance to an anesthesia procedure.  Click \"PACS images\" hyperlink below to view any stored images.  For specific procedure details, view procedure note authored by anesthesia.           "

## 2023-11-26 NOTE — PROGRESS NOTES
I saw and examined the patient this morning.  He is comfortable.  Still having mild to moderate pain that he describes as a 2 out of 10.  Some swelling into his left hand.  Large bulky dressing still in place that was changed on Saturday.  He is here today with his wife.    Exam:  On exam he has mild dependent edema into the dorsum of his left hand extending into the fingers.  He has a large bulky dressing encompassing from the wrist up to the armpit.  There is no obvious cellulitis extending down into the hand.  He is able to move all the fingers normally as well as the wrist.  He has normal sensation to light touch and brisk cap refill.    Labs:  Blood cultures x 2 are pending but no growth.  COVID PCR negative.  Cultures from the elbow show pansensitive Staph aureus.    Plan:  He is anticipating getting a PICC line tomorrow morning and to be discharged.  Infectious disease is recommending 3 weeks of IV antibiotics.  We have him scheduled to see us on Tuesday morning at 8 AM for a clinic visit.  At that point we will change his dressings and recommend whether or not he should start a therapy program for gentle motion or continue rest of the wound until it shows better signs of healing.

## 2023-11-27 ENCOUNTER — HOME INFUSION (PRE-WILLOW HOME INFUSION) (OUTPATIENT)
Dept: PHARMACY | Facility: CLINIC | Age: 58
End: 2023-11-27
Payer: COMMERCIAL

## 2023-11-27 VITALS
DIASTOLIC BLOOD PRESSURE: 64 MMHG | RESPIRATION RATE: 16 BRPM | SYSTOLIC BLOOD PRESSURE: 122 MMHG | HEIGHT: 75 IN | OXYGEN SATURATION: 96 % | HEART RATE: 79 BPM | WEIGHT: 206.7 LBS | BODY MASS INDEX: 25.7 KG/M2 | TEMPERATURE: 98.4 F

## 2023-11-27 LAB
CREAT SERPL-MCNC: 0.96 MG/DL (ref 0.67–1.17)
EGFRCR SERPLBLD CKD-EPI 2021: >90 ML/MIN/1.73M2

## 2023-11-27 PROCEDURE — 36415 COLL VENOUS BLD VENIPUNCTURE: CPT | Performed by: INTERNAL MEDICINE

## 2023-11-27 PROCEDURE — 250N000013 HC RX MED GY IP 250 OP 250 PS 637: Performed by: ORTHOPAEDIC SURGERY

## 2023-11-27 PROCEDURE — 272N000450 HC KIT 4FR POWER PICC SINGLE LUMEN

## 2023-11-27 PROCEDURE — 36568 INSJ PICC <5 YR W/O IMAGING: CPT

## 2023-11-27 PROCEDURE — 250N000009 HC RX 250: Performed by: INTERNAL MEDICINE

## 2023-11-27 PROCEDURE — 250N000011 HC RX IP 250 OP 636: Mod: JZ | Performed by: INTERNAL MEDICINE

## 2023-11-27 PROCEDURE — 99232 SBSQ HOSP IP/OBS MODERATE 35: CPT | Performed by: INTERNAL MEDICINE

## 2023-11-27 PROCEDURE — 82565 ASSAY OF CREATININE: CPT | Performed by: INTERNAL MEDICINE

## 2023-11-27 RX ORDER — AMOXICILLIN 250 MG
1 CAPSULE ORAL 2 TIMES DAILY
Qty: 10 TABLET | Refills: 0 | Status: SHIPPED | OUTPATIENT
Start: 2023-11-27 | End: 2023-12-02

## 2023-11-27 RX ORDER — HYDROMORPHONE HYDROCHLORIDE 2 MG/1
2 TABLET ORAL EVERY 6 HOURS PRN
Qty: 10 TABLET | Refills: 0 | Status: SHIPPED | OUTPATIENT
Start: 2023-11-27

## 2023-11-27 RX ORDER — CEFAZOLIN SODIUM 2 G/100ML
2 INJECTION, SOLUTION INTRAVENOUS EVERY 8 HOURS
Start: 2023-11-27 | End: 2023-12-18

## 2023-11-27 RX ADMIN — HYDROMORPHONE HYDROCHLORIDE 2 MG: 2 TABLET ORAL at 01:12

## 2023-11-27 RX ADMIN — POLYETHYLENE GLYCOL 3350 17 G: 17 POWDER, FOR SOLUTION ORAL at 08:14

## 2023-11-27 RX ADMIN — LIDOCAINE HYDROCHLORIDE 2 ML: 10 INJECTION, SOLUTION EPIDURAL; INFILTRATION; INTRACAUDAL; PERINEURAL at 12:59

## 2023-11-27 RX ADMIN — CEFAZOLIN SODIUM 2 G: 2 INJECTION, SOLUTION INTRAVENOUS at 08:05

## 2023-11-27 RX ADMIN — FAMOTIDINE 20 MG: 20 TABLET ORAL at 08:14

## 2023-11-27 RX ADMIN — SENNOSIDES AND DOCUSATE SODIUM 1 TABLET: 8.6; 5 TABLET ORAL at 08:14

## 2023-11-27 RX ADMIN — HYDROMORPHONE HYDROCHLORIDE 2 MG: 2 TABLET ORAL at 16:06

## 2023-11-27 RX ADMIN — CEFAZOLIN SODIUM 2 G: 2 INJECTION, SOLUTION INTRAVENOUS at 00:57

## 2023-11-27 RX ADMIN — CEFAZOLIN SODIUM 2 G: 2 INJECTION, SOLUTION INTRAVENOUS at 15:05

## 2023-11-27 ASSESSMENT — ACTIVITIES OF DAILY LIVING (ADL)
ADLS_ACUITY_SCORE: 36
ADLS_ACUITY_SCORE: 40
ADLS_ACUITY_SCORE: 36

## 2023-11-27 NOTE — PLAN OF CARE
Problem: Adult Inpatient Plan of Care  Goal: Optimal Comfort and Wellbeing  Intervention: Monitor Pain and Promote Comfort  Recent Flowsheet Documentation  Taken 11/27/2023 0112 by Magda Westfall, RN  Pain Management Interventions:   medication (see MAR)   emotional support   environmental changes   pillow support provided   repositioned   rest   therapeutic presence     Problem: Pain Acute  Goal: Optimal Pain Control and Function  Outcome: Progressing  Intervention: Develop Pain Management Plan  Recent Flowsheet Documentation  Taken 11/27/2023 0112 by Magda Westfall, RN  Pain Management Interventions:   medication (see MAR)   emotional support   environmental changes   pillow support provided   repositioned   rest   therapeutic presence     Problem: Adult Inpatient Plan of Care  Goal: Readiness for Transition of Care  Outcome: Progressing   Goal Outcome Evaluation:    Patient alert, oriented x 4. Pleasant and co-operative with cares. Complained of left elbow pain rating 3/10, aching and constant in nature. Administered Dilaudid prn, reported relief and was noted resting. Tolerated cefazolin antibiotic okay. CMS left hand intact. Will continue to monitor the patient.

## 2023-11-27 NOTE — PROGRESS NOTES
Pt has coverage for iv abx through their [employer] plan. Pt has a deductible of $3500 (met $1118.96). Once deductible has been met, pt should be covered at 100%.     ([facility]) In reference to admission date 11/24/2023 to check for iv abx coverage.     Please contact Intake with any questions, [phone] or In Basket pool,  Home Infusion ([phone]).

## 2023-11-27 NOTE — PROGRESS NOTES
[facility]    Referral received  from Ciera Avila RN CM, for IV antibiotics.    Benefits verified.   Pt has coverage for IV antibiotics under his [employer] plan.  Pt has a deductible of $3500 (he has met $1118.96 so far).  Once the deductible is met, pt should be covered at 100%.    Writer will speak with pt to review benefits, home infusion and to offer choice of agency/home infusion provider.    Thank you for the referral.    Lara Mckinley RN, BSN  [facility] Liaison  [phone] (Mon through Fri, 8:00 am-5:00 pm)  [phone] (Office)    ADDENDUM:  [facility] is able to provide home infusion nursing visits for this patient.    Writer spoke with pt to review benefits, home infusion and to offer choice of agency/home infusion provider.  Pt would like to go with [facility] for home infusion needs. [facility] will plan to do teach in the home this evening, as well as deliver pt's home IV abx med and supplies.  Pt to get the 4pm dose in the hospital today prior to discharging.  Pt had no further questions or concerns.  Updated Ciera Avila RN CM and pt's bedside RN.

## 2023-11-27 NOTE — PROGRESS NOTES
Infectious Diseases Progress Note  [facility]    Date of visit: 11/27/2023       ASSESSMENT   58-year-old man without significant past medical history admitted with left elbow infection.    Left elbow trauma about 30 years ago.  Required radial head implant  Left elbow revision on 11/9/2023 with Dr. Cornelius.  Patient reports hardware and bone fragments were removed along with debridement around the patient's ulnar nerve (?)  Surgical site infection.  Patient developed swelling and purulent drainage over the incision site.  Fevers at home.  Underwent aspiration on 11/22 with 79,000 white cells (100% neutrophils).  Cultures now with MSSA.  Status post elbow debridement on 11/24 with hardware removal.  Small amount of pus was noted in the posterior elbow joint.  After debridements vancomycin powder was distributed in the joint.  Patient was on clindamycin prior to admission.    Principal Problem:    Infected elbow (H)  Active Problems:    Septic joint of left forearm (H)       PLAN   -Plan for cefazolin 2gm iv q8hrs. Duration 4-6 weeks  -could transition to po antibiotics sooner if doing well  -PICC line placed today  -weekly labs: creatinine, cbc with diff, CRP  -follow-up ID clinic 2-4 weeks     Okay to discharge from ID perspective once outpatient iv antibiotics are arranged.     Edy Castro MD  [facility]  Direct messaging: [facility] Paging  On-Call ID provider: [phone], option: 9      ===========================================      SUBJECTIVE / INTERVAL HISTORY:     No events. Feels well. PICC line placed today      Antibiotics   Perioperative cefazolin, 11/25-      Previous:  Clindamycin prior to admission  Vancomycin 11/24-11/25    Physical Exam     Temp:  [98.7  F (37.1  C)-99.9  F (37.7  C)] 98.7  F (37.1  C)  Pulse:  [72-80] 77  Resp:  [18] 18  BP: (107-110)/(56-68) 109/68  SpO2:  [90 %-98 %] 93 %    /68 (BP Location: Right arm)   Pulse 77   Temp 98.7  F  "(37.1  C) (Oral)   Resp 18   Ht 1.905 m (6' 3\")   Wt 93.8 kg (206 lb 11.2 oz)   SpO2 93%   BMI 25.84 kg/m      GENERAL:  well-developed, well-nourished, lying in bed in no acute distress.   HENT:  Head is normocephalic, atraumatic.   EYES:  Eyes have anicteric sclerae without conjunctival injection   LUNGS:  normal resp pattern  EXT: Left elbow and arm wrapped in a sling, hand swollen. Right arm PICC in place  SKIN:  No acute rashes.   NEUROLOGIC:  Grossly nonfocal.      Cultures   11/22 synovial fluid culture: MSSA  11/24 blood culture x 2: no growth to date     Susceptibility data from last 90 days.  Collected Specimen Info Organism Clindamycin Daptomycin Doxycycline Erythromycin Gentamicin Oxacillin Tetracycline Trimethoprim/Sulfamethoxazole  Vancomycin   11/22/23 Synovial fluid from Elbow, Left Staphylococcus aureus  S  S  S  S  S  S  S  S  S           Pertinent Labs:     Recent Labs   Lab 11/24/23  0713   WBC 9.2   HGB 14.0          No results for input(s): \"NA\", \"CO2\", \"BUN\", \"CREATININE\", \"ALBUMIN\", \"BILITOT\", \"ALKPHOS\", \"ALT\", \"AST\", \"GLUCOSE\" in the last 168 hours.    Invalid input(s): \"K\", \"CL\", \"CALCIUM\", \"LABALBU\", \"PROT\"    No results for input(s): \"CRPI\", \"SED\" in the last 168 hours.        Imaging:     No results found.      Data reviewed today: I reviewed all medications, new labs and imaging results over the last 24 hours. I personally reviewed no images or EKG's today.  The patient's care was discussed with the Bedside Nurse and Patient.      "

## 2023-11-27 NOTE — PROGRESS NOTES
Care Management Discharge Note    Discharge Date: 11/27/2023       Discharge Disposition: Home Infusion    Discharge Services: None    Discharge DME: None    Discharge Transportation:      Private pay costs discussed:  [facility] IV abx cost discussed with [facility] liaison Lara    Does the patient's insurance plan have a 3 day qualifying hospital stay waiver?  No    PAS Confirmation Code: NA  Patient/family educated on Medicare website which has current facility and service quality ratings:  NA    Education Provided on the Discharge Plan: Yes  Persons Notified of Discharge Plans: per care team  Patient/Family in Agreement with the Plan: yes    Handoff Referral Completed: Yes    Additional Information:    Plan for PICC line today and will need 3 weeks of IV Cefazolin.    CM placed referral to [facility] ([facility]).    Lara stapleton from [facility] reviewed home infusion with patient.    Plan to do in-home teach this evening.    No additional CM needs identified.     Ciera Avila RN

## 2023-11-27 NOTE — PLAN OF CARE
Goal Outcome Evaluation:    Problem: Adult Inpatient Plan of Care  Goal: Optimal Comfort and Wellbeing  Outcome: Progressing     Problem: Adult Inpatient Plan of Care  Goal: Readiness for Transition of Care  Outcome: Progressing        Pt aox4, claiming mild pain in L elbow controlled with tylenol. Good appetite. Dressing CDI. Indp in room. Pt sleeping comfortably.

## 2023-11-27 NOTE — PROCEDURES
"PICC Line Insertion Procedure Note  Pt. Name: Scar Easley  MRN:        [id number]    Procedure: Insertion of a  single Lumen  4 fr  Bard SOLO (valved) Power PICC, Lot number [id number]    Indications: Home antibiotic    Contraindications :left arm infection    Procedure Details     Patient identified with 2 identifiers and \"Time Out\" conducted.  .     Central line insertion bundle followed: hand hygiene performed prior to procedure, site cleansed with cholraprep, hat, mask, sterile gloves, sterile gown worn, patient draped with maximum barrier head to toe drape, sterile field maintained.    The vein was assessed and found to be compressible and of adequate size.     Lidocaine 1% 2 ml administered sq to the insertion site. A 4 Fr PICC was inserted into the brachial vein of the right arm with ultrasound guidance. 1 attempt(s) required to access vein.   Catheter threaded without difficulty. Good blood return noted.    Modified Seldinger Technique used for insertion.    The 8 sharps that are included in the PICC insertion kit were accounted for and disposed of in the sharps container prior to breakdown of the sterile field.    Catheter secured with Statlock, biopatch and Tegaderm dressing applied.    Findings:    Total catheter length  43 cm, with 0 cm exposed. Mid upper arm circumference is 31 cm. Catheter was flushed with 20 cc NS. Patient  tolerated procedure well.    Tip placement verified by 3CG technology . Tip placement in the SVC/RA junction.    CLABSI prevention brochure left at bedside.    Patient's primary RN notified PICC is ready for use.      Comments:        Tam SOMMERN,RN,VA-BC  Vascular Access - [facility]      "

## 2023-11-27 NOTE — PROGRESS NOTES
"[facility]    Medicine Progress Note - Hospitalist Service    Date of Admission:  11/24/2023    Assessment & Plan   58-year-old male admitted following scheduled I&D of the left elbow with removal of silicone radial head implant secondary to pyogenic arthritis of left elbow.  [facility] consulted for medical management.     Septic joint left elbow infection status post I&D and hardware removal: Surgical cultures pending, ID consulted.  Cultures from aspiration done on 1122 shows Staph aureus.  Currently on IV Cefazolin.  Splint as per orthopedics. Continue until 1/4/24. Ordered home infusion at discharge  History of PE: Not on anticoagulation PTA.  continue Lovenox while in the hospital.   history of lumbar disc disease  Hiccups likely due to gastric irritation--resolved with pepcid and IV reglan. Restart home meds at discharge     Code status:Full Code          Diet: Advance Diet as Tolerated: Regular Diet Adult  Discharge Instruction - Regular Diet Adult    DVT Prophylaxis: Enoxaparin (Lovenox) SQ  Ruffin Catheter: Not present  Lines: PRESENT      PICC 11/27/23 Single Lumen Right Brachial vein medial antibiotics-Site Assessment: WDL    Cardiac Monitoring: None  Code Status: Full Code      Clinically Significant Risk Factors Present on Admission                       # Overweight: Estimated body mass index is 25.84 kg/m  as calculated from the following:    Height as of this encounter: 1.905 m (6' 3\").    Weight as of this encounter: 93.8 kg (206 lb 11.2 oz).              Disposition Plan      Expected Discharge Date: 11/28/2023    Discharge Delays: IV Medication - consider oral or Home Infusion  Placement - Homecare    Discharge Comments: home infusion            Eliseo Collins MD  Hospitalist Service  [facility]  Securely message with [employer] (more info)  Text page via [facility] Paging/Directory "   ______________________________________________________________________    Interval History   Chart reviewed.  No hiccups today. PICC placed today  No fever overnight.    Physical Exam   Vital Signs: Temp: 98.7  F (37.1  C) Temp src: Oral BP: 109/68 Pulse: 77   Resp: 18 SpO2: 93 % O2 Device: None (Room air)    Weight: 206 lbs 11.2 oz    Patient having hiccups  Left elbow in splint      Medical Decision Making        35 MINUTES SPENT BY ME on the date of service doing chart review, history, exam, documentation & further activities per the note.  MANAGEMENT DISCUSSED with the following over the past 24 hours: Patient   NOTE(S)/MEDICAL RECORDS REVIEWED over the past 24 hours: ID notes      Data     I have personally reviewed the following data over the past 24 hrs:    N/A  \   N/A   / N/A     N/A N/A N/A /  N/A   N/A N/A 0.96 \       Imaging results reviewed over the past 24 hrs:   No results found for this or any previous visit (from the past 24 hour(s)).

## 2023-11-27 NOTE — PLAN OF CARE
"Patient alert and oriented. Ok to discharge home today with home infusion services.  PICC line in place for IV antibiotics at home. Went over discharge instructions with patient.  Escorted to font entrance upon discharge.  Yamile Ruano RN       Problem: Adult Inpatient Plan of Care  Goal: Plan of Care Review  Description: The Plan of Care Review/Shift note should be completed every shift.  The Outcome Evaluation is a brief statement about your assessment that the patient is improving, declining, or no change.  This information will be displayed automatically on your shift  note.  Outcome: Met  Goal: Patient-Specific Goal (Individualized)  Description: You can add care plan individualizations to a care plan. Examples of Individualization might be:  \"Parent requests to be called daily at 9am for status\", \"I have a hard time hearing out of my right ear\", or \"Do not touch me to wake me up as it startles  me\".  Outcome: Met  Goal: Absence of Hospital-Acquired Illness or Injury  Outcome: Met  Goal: Optimal Comfort and Wellbeing  Outcome: Met  Goal: Readiness for Transition of Care  Outcome: Met     Problem: Infection  Goal: Absence of Infection Signs and Symptoms  Outcome: Met     Problem: Pain Acute  Goal: Optimal Pain Control and Function  Outcome: Met     Problem: Fall Injury Risk  Goal: Absence of Fall and Fall-Related Injury  Outcome: Met   Goal Outcome Evaluation:                        "

## 2023-11-27 NOTE — PLAN OF CARE
"Goal Outcome Evaluation:      Problem: Adult Inpatient Plan of Care  Goal: Patient-Specific Goal (Individualized)  Description: You can add care plan individualizations to a care plan. Examples of Individualization might be:  \"Parent requests to be called daily at 9am for status\", \"I have a hard time hearing out of my right ear\", or \"Do not touch me to wake me up as it startles  me\".  Outcome: Progressing    Problem: Pain Acute  Goal: Optimal Pain Control and Function  Outcome: Progressing     Problem: Adult Inpatient Plan of Care  Goal: Optimal Comfort and Wellbeing  Outcome: Progressing    A/O x4. VSS. Denied any significant pain. Dressing on left arm is dry and intake. Independent with ADLs. Took shower independently.                               "

## 2023-11-28 ENCOUNTER — LAB REQUISITION (OUTPATIENT)
Dept: LAB | Facility: CLINIC | Age: 58
End: 2023-11-28
Payer: COMMERCIAL

## 2023-11-28 DIAGNOSIS — M00.822: ICD-10-CM

## 2023-11-28 LAB
AST SERPL W P-5'-P-CCNC: 24 U/L (ref 0–45)
BASOPHILS # BLD AUTO: 0 10E3/UL (ref 0–0.2)
BASOPHILS NFR BLD AUTO: 0 %
CREAT SERPL-MCNC: 0.93 MG/DL (ref 0.67–1.17)
CRP SERPL-MCNC: 101.75 MG/L
EGFRCR SERPLBLD CKD-EPI 2021: >90 ML/MIN/1.73M2
EOSINOPHIL # BLD AUTO: 0.3 10E3/UL (ref 0–0.7)
EOSINOPHIL NFR BLD AUTO: 3 %
ERYTHROCYTE [DISTWIDTH] IN BLOOD BY AUTOMATED COUNT: 12.1 % (ref 10–15)
HCT VFR BLD AUTO: 35.7 % (ref 40–53)
HGB BLD-MCNC: 12.3 G/DL (ref 13.3–17.7)
IMM GRANULOCYTES # BLD: 0.1 10E3/UL
IMM GRANULOCYTES NFR BLD: 1 %
LYMPHOCYTES # BLD AUTO: 0.9 10E3/UL (ref 0.8–5.3)
LYMPHOCYTES NFR BLD AUTO: 9 %
MCH RBC QN AUTO: 31.6 PG (ref 26.5–33)
MCHC RBC AUTO-ENTMCNC: 34.5 G/DL (ref 31.5–36.5)
MCV RBC AUTO: 92 FL (ref 78–100)
MONOCYTES # BLD AUTO: 0.6 10E3/UL (ref 0–1.3)
MONOCYTES NFR BLD AUTO: 6 %
NEUTROPHILS # BLD AUTO: 7.7 10E3/UL (ref 1.6–8.3)
NEUTROPHILS NFR BLD AUTO: 81 %
NRBC # BLD AUTO: 0 10E3/UL
NRBC BLD AUTO-RTO: 0 /100
PLATELET # BLD AUTO: 460 10E3/UL (ref 150–450)
RBC # BLD AUTO: 3.89 10E6/UL (ref 4.4–5.9)
WBC # BLD AUTO: 9.5 10E3/UL (ref 4–11)

## 2023-11-28 PROCEDURE — 85025 COMPLETE CBC W/AUTO DIFF WBC: CPT | Performed by: INTERNAL MEDICINE

## 2023-11-28 PROCEDURE — 84450 TRANSFERASE (AST) (SGOT): CPT | Performed by: INTERNAL MEDICINE

## 2023-11-28 PROCEDURE — 86140 C-REACTIVE PROTEIN: CPT | Performed by: INTERNAL MEDICINE

## 2023-11-28 PROCEDURE — 82565 ASSAY OF CREATININE: CPT | Performed by: INTERNAL MEDICINE

## 2023-11-29 LAB
BACTERIA BLD CULT: NO GROWTH
BACTERIA BLD CULT: NO GROWTH

## 2023-12-05 ENCOUNTER — LAB REQUISITION (OUTPATIENT)
Dept: LAB | Facility: CLINIC | Age: 58
End: 2023-12-05
Payer: COMMERCIAL

## 2023-12-05 DIAGNOSIS — M00.822: ICD-10-CM

## 2023-12-05 LAB
AST SERPL W P-5'-P-CCNC: 23 U/L (ref 0–45)
BASOPHILS # BLD AUTO: 0 10E3/UL (ref 0–0.2)
BASOPHILS NFR BLD AUTO: 1 %
CREAT SERPL-MCNC: 0.86 MG/DL (ref 0.67–1.17)
CRP SERPL-MCNC: 19.54 MG/L
EGFRCR SERPLBLD CKD-EPI 2021: >90 ML/MIN/1.73M2
EOSINOPHIL # BLD AUTO: 0.3 10E3/UL (ref 0–0.7)
EOSINOPHIL NFR BLD AUTO: 4 %
ERYTHROCYTE [DISTWIDTH] IN BLOOD BY AUTOMATED COUNT: 11.9 % (ref 10–15)
HCT VFR BLD AUTO: 35.1 % (ref 40–53)
HGB BLD-MCNC: 11.9 G/DL (ref 13.3–17.7)
IMM GRANULOCYTES # BLD: 0 10E3/UL
IMM GRANULOCYTES NFR BLD: 0 %
LYMPHOCYTES # BLD AUTO: 1.1 10E3/UL (ref 0.8–5.3)
LYMPHOCYTES NFR BLD AUTO: 16 %
MCH RBC QN AUTO: 31.6 PG (ref 26.5–33)
MCHC RBC AUTO-ENTMCNC: 33.9 G/DL (ref 31.5–36.5)
MCV RBC AUTO: 93 FL (ref 78–100)
MONOCYTES # BLD AUTO: 0.5 10E3/UL (ref 0–1.3)
MONOCYTES NFR BLD AUTO: 8 %
NEUTROPHILS # BLD AUTO: 4.9 10E3/UL (ref 1.6–8.3)
NEUTROPHILS NFR BLD AUTO: 71 %
NRBC # BLD AUTO: 0 10E3/UL
NRBC BLD AUTO-RTO: 0 /100
PLATELET # BLD AUTO: 483 10E3/UL (ref 150–450)
RBC # BLD AUTO: 3.77 10E6/UL (ref 4.4–5.9)
WBC # BLD AUTO: 6.8 10E3/UL (ref 4–11)

## 2023-12-05 PROCEDURE — 84450 TRANSFERASE (AST) (SGOT): CPT | Performed by: INTERNAL MEDICINE

## 2023-12-05 PROCEDURE — 85025 COMPLETE CBC W/AUTO DIFF WBC: CPT | Performed by: INTERNAL MEDICINE

## 2023-12-05 PROCEDURE — 86140 C-REACTIVE PROTEIN: CPT | Performed by: INTERNAL MEDICINE

## 2023-12-05 PROCEDURE — 82565 ASSAY OF CREATININE: CPT | Performed by: INTERNAL MEDICINE

## 2023-12-06 LAB — BACTERIA SNV CULT: NORMAL

## 2023-12-07 ENCOUNTER — OFFICE VISIT (OUTPATIENT)
Dept: INFECTIOUS DISEASES | Facility: CLINIC | Age: 58
End: 2023-12-07
Payer: COMMERCIAL

## 2023-12-07 VITALS
OXYGEN SATURATION: 96 % | HEART RATE: 60 BPM | BODY MASS INDEX: 26.01 KG/M2 | SYSTOLIC BLOOD PRESSURE: 110 MMHG | WEIGHT: 208.1 LBS | TEMPERATURE: 97.7 F | DIASTOLIC BLOOD PRESSURE: 78 MMHG

## 2023-12-07 DIAGNOSIS — M86.9: Primary | ICD-10-CM

## 2023-12-07 PROCEDURE — 99213 OFFICE O/P EST LOW 20 MIN: CPT | Performed by: INTERNAL MEDICINE

## 2023-12-07 NOTE — PROGRESS NOTES
[facility] INFECTIOUS DISEASE CLINIC [facility]   FOLLOW UP NOTE    Date: 12/07/2023   Patient Name: Scar Easley   YOB: 1965  MRN: [id number]      ASSESSMENT:  58-year-old man without significant past medical history recently hospitalized with left elbow infection.     Left elbow trauma about 30 years ago.  Required radial head implant  Left elbow revision on 11/9/2023 with Dr. Cornelius.  Patient reports hardware and bone fragments were removed along with debridement around the patient's ulnar nerve (?)  Surgical site infection.  Patient developed swelling and purulent drainage over the incision site.  Fevers at home.  Underwent aspiration on 11/22 with 79,000 white cells (100% neutrophils).  Cultures with MSSA.  Status post elbow debridement on 11/24 with hardware removal.  Small amount of pus was noted in the posterior elbow joint.  After debridements vancomycin powder was distributed in the joint.  Patient was on clindamycin prior to admission.  Discharged with IV cefazolin.  Tolerating this well.  CRP coming down.    PLAN:  -Continue IV cefazolin, anticipate a 6-week course of treatment  -We discussed early transition to oral antibiotics, however since the CRP is still elevated we will continue with cefazolin today  -I am out of the clinic next week, however if CRP is normalized next week, the patient will contact our clinic by phone or MyChart.  At that point, if the CRP is in the normal range, then we will start Bactrim double strength twice daily x 4 weeks (until 1/5/2024).  The PICC line could be removed as well  -If the CRP is still elevated, then we will continue with the cefazolin, and I will touch base with him when I return    Return to clinic as needed.    Edy Castro MD  [facility]   Clinic phone: [phone]  Clinic fax: [phone]    ______________________________________________________________________    HISTORY OF PRESENT ILLNESS:   From  inpatient ID consult on 11/20/2023:    Scar Easley is a 58 year old man without significant past medical history who is admitted for left elbow surgery.  He reports a distant history of elbow surgery after trauma with an elbow implant.  Over the years he has had increasing pain and decreased range of motion.  Therefore he followed up with orthopedics and underwent debridement and hardware removal on 11/9.  Unfortunately he began having swelling and drainage from his incision site.  He began feeling ill about a week ago with fevers.  He thought he might have COVID.  He was seen in clinic and had an elbow aspiration which is now growing Staph aureus.  Due to concern for infection he was admitted today for left elbow debridement.       SUBJECTIVE / INTERVAL HISTORY:   Scar Easley returns for follow up.  He was discharged with IV cefazolin.  He is tolerating this well without issues.  No PICC line issues.  He will begin physical therapy for his left elbow tomorrow.  He still has swelling and limited range of motion.  He has had follow-up with his surgeon, Dr. Cornelius.      ROS:   No fevers, no rashes      Current Outpatient Medications:     acetaminophen (TYLENOL) 500 MG tablet, Take 1,500 mg by mouth every 6 hours as needed for mild pain, Disp: , Rfl:     ceFAZolin (ANCEF) intermittent infusion 2 g in 100 mL dextrose PRE-MIX, Inject 100 mLs (2 g) into the vein every 8 hours for 38 days Until 1/4/24, Disp: , Rfl:     HYDROmorphone (DILAUDID) 2 MG tablet, Take 1 tablet (2 mg) by mouth every 6 hours as needed for severe pain (Patient not taking: Reported on 12/7/2023), Disp: 10 tablet, Rfl: 0    omeprazole (PRILOSEC) 20 MG DR capsule, Take 20 mg by mouth daily as needed (Patient not taking: Reported on 12/7/2023), Disp: , Rfl:       OBJECTIVE:  /78   Pulse 60   Temp 97.7  F (36.5  C) (Oral)   Wt 94.4 kg (208 lb 1.6 oz)   SpO2 96%   BMI 26.01 kg/m        GEN: No acute distress.    HENT: Head is  normocephalic, atraumatic.   EYES: Eyes have anicteric sclerae without conjunctival injection   RESPIRATORY:  Normal breathing pattern.   EXTREMITIES: No edema.  Left elbow is swollen.  Flexion just past 90 degrees.  No wound dehiscence, no erythema or warmth  SKIN/HAIR/NAILS:  No rashes.  Right upper extremity PICC line is in place without any surrounding erythema. No stigmata of endocarditis.  NEUROLOGIC: Grossly nonfocal. Normal gait and station      Pertinent labs:    Lab Results   Component Value Date    CRPI 19.54 (H) 12/05/2023         Lab Results   Component Value Date    ALT 22 12/18/2019    AST 23 12/05/2023    ALKPHOS 81 12/18/2019         MICROBIOLOGY DATA:  Susceptibility data from last 90 days.  Collected Specimen Info Organism Clindamycin Daptomycin Doxycycline Erythromycin Gentamicin Oxacillin Tetracycline Trimethoprim/Sulfamethoxazole  Vancomycin   11/22/23 Synovial fluid from Elbow, Left Staphylococcus aureus  S  S  S  S  S  S  S  S  S        RADIOLOGY:  None     Attestation:  Total time preparing to see this patient, face-to-face time, and coordinating care time on the same calendar date: 24 minutes.  Face-face time: 15 minutes.  Over 50% of face-to-face time was spent in counseling/coordination of care.

## 2023-12-12 ENCOUNTER — LAB REQUISITION (OUTPATIENT)
Dept: LAB | Facility: CLINIC | Age: 58
End: 2023-12-12
Payer: COMMERCIAL

## 2023-12-12 DIAGNOSIS — M00.822: ICD-10-CM

## 2023-12-12 LAB
AST SERPL W P-5'-P-CCNC: 24 U/L (ref 0–45)
BASOPHILS # BLD AUTO: 0 10E3/UL (ref 0–0.2)
BASOPHILS NFR BLD AUTO: 1 %
CREAT SERPL-MCNC: 0.95 MG/DL (ref 0.67–1.17)
CRP SERPL-MCNC: 5.63 MG/L
EGFRCR SERPLBLD CKD-EPI 2021: >90 ML/MIN/1.73M2
EOSINOPHIL # BLD AUTO: 0.3 10E3/UL (ref 0–0.7)
EOSINOPHIL NFR BLD AUTO: 5 %
ERYTHROCYTE [DISTWIDTH] IN BLOOD BY AUTOMATED COUNT: 12.4 % (ref 10–15)
HCT VFR BLD AUTO: 36.1 % (ref 40–53)
HGB BLD-MCNC: 12.4 G/DL (ref 13.3–17.7)
IMM GRANULOCYTES # BLD: 0 10E3/UL
IMM GRANULOCYTES NFR BLD: 1 %
LYMPHOCYTES # BLD AUTO: 1.4 10E3/UL (ref 0.8–5.3)
LYMPHOCYTES NFR BLD AUTO: 21 %
MCH RBC QN AUTO: 31.7 PG (ref 26.5–33)
MCHC RBC AUTO-ENTMCNC: 34.3 G/DL (ref 31.5–36.5)
MCV RBC AUTO: 92 FL (ref 78–100)
MONOCYTES # BLD AUTO: 0.6 10E3/UL (ref 0–1.3)
MONOCYTES NFR BLD AUTO: 9 %
NEUTROPHILS # BLD AUTO: 4.1 10E3/UL (ref 1.6–8.3)
NEUTROPHILS NFR BLD AUTO: 63 %
NRBC # BLD AUTO: 0 10E3/UL
NRBC BLD AUTO-RTO: 0 /100
PLATELET # BLD AUTO: 359 10E3/UL (ref 150–450)
RBC # BLD AUTO: 3.91 10E6/UL (ref 4.4–5.9)
WBC # BLD AUTO: 6.4 10E3/UL (ref 4–11)

## 2023-12-12 PROCEDURE — 85025 COMPLETE CBC W/AUTO DIFF WBC: CPT | Performed by: INTERNAL MEDICINE

## 2023-12-12 PROCEDURE — 86140 C-REACTIVE PROTEIN: CPT | Performed by: INTERNAL MEDICINE

## 2023-12-12 PROCEDURE — 84450 TRANSFERASE (AST) (SGOT): CPT | Performed by: INTERNAL MEDICINE

## 2023-12-12 PROCEDURE — 82565 ASSAY OF CREATININE: CPT | Performed by: INTERNAL MEDICINE

## 2023-12-13 NOTE — DISCHARGE SUMMARY
Patient was admitted after elbow arthroscopy and drainage 4 to 5 days after surgery.  Elbow aspiration showed positive cultures for staph infection.  Patient was admitted to the hospital for surgical irrigation and debridement.  Postoperative course was benign.  Infectious disease was consulted.  They recommended 4 weeks of IV antibiotic administration followed by oral antibiotics.  Patient was discharged after PICC line placement.  Patient will follow-up in clinic 2 to 3 days after discharge.

## 2023-12-18 ENCOUNTER — LAB REQUISITION (OUTPATIENT)
Dept: LAB | Facility: CLINIC | Age: 58
End: 2023-12-18
Payer: COMMERCIAL

## 2023-12-18 ENCOUNTER — TELEPHONE (OUTPATIENT)
Dept: INFECTIOUS DISEASES | Facility: CLINIC | Age: 58
End: 2023-12-18

## 2023-12-18 DIAGNOSIS — M00.822: ICD-10-CM

## 2023-12-18 DIAGNOSIS — M86.9: Primary | ICD-10-CM

## 2023-12-18 LAB
AST SERPL W P-5'-P-CCNC: 23 U/L (ref 0–45)
BASOPHILS # BLD AUTO: 0 10E3/UL (ref 0–0.2)
BASOPHILS NFR BLD AUTO: 1 %
CREAT SERPL-MCNC: 0.87 MG/DL (ref 0.67–1.17)
CRP SERPL-MCNC: <3 MG/L
EGFRCR SERPLBLD CKD-EPI 2021: >90 ML/MIN/1.73M2
EOSINOPHIL # BLD AUTO: 0.5 10E3/UL (ref 0–0.7)
EOSINOPHIL NFR BLD AUTO: 9 %
ERYTHROCYTE [DISTWIDTH] IN BLOOD BY AUTOMATED COUNT: 12.5 % (ref 10–15)
HCT VFR BLD AUTO: 38.7 % (ref 40–53)
HGB BLD-MCNC: 13.1 G/DL (ref 13.3–17.7)
HOLD SPECIMEN: NORMAL
IMM GRANULOCYTES # BLD: 0 10E3/UL
IMM GRANULOCYTES NFR BLD: 0 %
LYMPHOCYTES # BLD AUTO: 1.4 10E3/UL (ref 0.8–5.3)
LYMPHOCYTES NFR BLD AUTO: 25 %
MCH RBC QN AUTO: 31 PG (ref 26.5–33)
MCHC RBC AUTO-ENTMCNC: 33.9 G/DL (ref 31.5–36.5)
MCV RBC AUTO: 92 FL (ref 78–100)
MONOCYTES # BLD AUTO: 0.5 10E3/UL (ref 0–1.3)
MONOCYTES NFR BLD AUTO: 9 %
NEUTROPHILS # BLD AUTO: 3.3 10E3/UL (ref 1.6–8.3)
NEUTROPHILS NFR BLD AUTO: 56 %
NRBC # BLD AUTO: 0 10E3/UL
NRBC BLD AUTO-RTO: 0 /100
PLATELET # BLD AUTO: 249 10E3/UL (ref 150–450)
RBC # BLD AUTO: 4.23 10E6/UL (ref 4.4–5.9)
WBC # BLD AUTO: 5.8 10E3/UL (ref 4–11)

## 2023-12-18 PROCEDURE — 82565 ASSAY OF CREATININE: CPT | Performed by: INTERNAL MEDICINE

## 2023-12-18 PROCEDURE — 85004 AUTOMATED DIFF WBC COUNT: CPT | Performed by: INTERNAL MEDICINE

## 2023-12-18 PROCEDURE — 84450 TRANSFERASE (AST) (SGOT): CPT | Performed by: INTERNAL MEDICINE

## 2023-12-18 PROCEDURE — 86140 C-REACTIVE PROTEIN: CPT | Performed by: INTERNAL MEDICINE

## 2023-12-18 RX ORDER — SULFAMETHOXAZOLE/TRIMETHOPRIM 800-160 MG
1 TABLET ORAL 2 TIMES DAILY
Qty: 36 TABLET | Refills: 0 | Status: SHIPPED | OUTPATIENT
Start: 2023-12-18 | End: 2024-01-05

## 2023-12-18 NOTE — TELEPHONE ENCOUNTER
----- Message from Edy Castro MD sent at 12/18/2023  4:20 PM CST -----  I am stopping iv antibiotics on this patient. Please contact home infusion to have PICC removed.  He is traveling this week and will have to have it removed when he returns.

## 2023-12-18 NOTE — PROGRESS NOTES
Labs reviewed. CRP now normal.    Will stop cefazolin  Start TMP/SMX DS bid until 1/5/24    I called patient for plan. He is traveling to [place], therefore I am sending his prescription to [facility] in [place], [place].    Will need to have PICC line removed after returning from his trip.

## 2023-12-26 NOTE — ANESTHESIA POSTPROCEDURE EVALUATION
Patient: Scar Easley    Procedure: Procedure(s):  IRRIGATION AND DEBRIDEMENT, UPPER EXTREMITY - LEFT ELBOW, REMOVAL SILICONE RADIAL HEAD LEFT ELBOW       Anesthesia Type:  General    Note:  Disposition: Inpatient; Admission   Postop Pain Control: Uneventful            Sign Out: Well controlled pain   PONV: No   Neuro/Psych: Uneventful            Sign Out: Acceptable/Baseline neuro status   Airway/Respiratory: Uneventful            Sign Out: Acceptable/Baseline resp. status   CV/Hemodynamics: Uneventful            Sign Out: Acceptable CV status; No obvious hypovolemia; No obvious fluid overload   Other NRE: NONE   DID A NON-ROUTINE EVENT OCCUR? No           Last vitals:  Vitals:    11/26/23 2339 11/27/23 0747 11/27/23 1527   BP: 110/56 109/68 122/64   Pulse: 80 77 79   Resp: 18 18 16   Temp: 37.7  C (99.9  F) 37.1  C (98.7  F) 36.9  C (98.4  F)   SpO2: 90% 93% 96%       Electronically Signed By: Thong Fernandez MD  December 25, 2023  8:48 PM

## 2025-01-04 ENCOUNTER — HEALTH MAINTENANCE LETTER (OUTPATIENT)
Age: 60
End: 2025-01-04

## (undated) DEVICE — PREP DYNA-HEX 4% CHG SCRUB 4OZ BOTTLE MDS098710

## (undated) DEVICE — SU PROLENE 3-0 PS-1 18" 8663G

## (undated) DEVICE — Device

## (undated) DEVICE — GLOVE BIOGEL PI ULTRATOUCH G SZ 7.0 42170

## (undated) DEVICE — TRAY PREP DRY SKIN SCRUB 067

## (undated) DEVICE — SPLINT ORTH FBGLS ORTHO-GLASS 4INX30IN OG-430PC

## (undated) DEVICE — SUCTION MANIFOLD NEPTUNE 2 SYS 1 PORT 702-025-000

## (undated) DEVICE — BANDAGE ELASTIC 4X550 LF DBL 593-94LF

## (undated) DEVICE — SOL ISOPROPYL RUBBING ALCOHOL USP 70% 4OZ HDX-20 I0020

## (undated) DEVICE — SOL NACL 0.9% IRRIG 1000ML BOTTLE 2F7124

## (undated) DEVICE — DRAPE STOCKINETTE 4" 8544

## (undated) DEVICE — DRESSING XEROFORM PETROLATUM 5X9 33605

## (undated) DEVICE — SOL RINGERS LACTATED 1000ML BAG 2B2324X

## (undated) DEVICE — DRSG ABD TNDRSRB WET PRUF 8IN X 10IN STRL  9194A

## (undated) DEVICE — SUCTION IRR SYSTEM W/O TIP INTERPULSE HANDPIECE 0210-100-000

## (undated) DEVICE — BONE CLEANING TIP INTERPULSE  0210-010-000

## (undated) DEVICE — DRSG GAUZE 4X4" TRAY

## (undated) DEVICE — CAST PADDING 4" STERILE 9044S

## (undated) DEVICE — CUSTOM PACK UPPER EXTREMITY SOP5BUEHEC

## (undated) DEVICE — PLATE GROUNDING ADULT W/CORD 9165L

## (undated) DEVICE — GLOVE BIOGEL PI ULTRATOUCH G SZ 7.5 42175

## (undated) DEVICE — SUTURE PDS 3-0 18IN PS-2 + UND PDP497G

## (undated) DEVICE — SOL WATER IRRIG 1000ML BOTTLE 2F7114

## (undated) DEVICE — SLING ARM FOAM L 0814-0794

## (undated) RX ORDER — PROPOFOL 10 MG/ML
INJECTION, EMULSION INTRAVENOUS
Status: DISPENSED
Start: 2023-11-24

## (undated) RX ORDER — LIDOCAINE HYDROCHLORIDE 10 MG/ML
INJECTION, SOLUTION EPIDURAL; INFILTRATION; INTRACAUDAL; PERINEURAL
Status: DISPENSED
Start: 2023-11-24

## (undated) RX ORDER — DEXAMETHASONE SODIUM PHOSPHATE 10 MG/ML
INJECTION, SOLUTION INTRAMUSCULAR; INTRAVENOUS
Status: DISPENSED
Start: 2023-11-24

## (undated) RX ORDER — VANCOMYCIN HYDROCHLORIDE 1 G/20ML
INJECTION, POWDER, LYOPHILIZED, FOR SOLUTION INTRAVENOUS
Status: DISPENSED
Start: 2023-11-24

## (undated) RX ORDER — BUPIVACAINE HYDROCHLORIDE 5 MG/ML
INJECTION, SOLUTION EPIDURAL; INTRACAUDAL
Status: DISPENSED
Start: 2023-11-24